# Patient Record
Sex: FEMALE | Race: WHITE | NOT HISPANIC OR LATINO | Employment: FULL TIME | URBAN - METROPOLITAN AREA
[De-identification: names, ages, dates, MRNs, and addresses within clinical notes are randomized per-mention and may not be internally consistent; named-entity substitution may affect disease eponyms.]

---

## 2017-01-30 ENCOUNTER — TELEPHONE (OUTPATIENT)
Dept: OBSTETRICS AND GYNECOLOGY | Facility: CLINIC | Age: 32
End: 2017-01-30

## 2017-01-30 NOTE — TELEPHONE ENCOUNTER
----- Message from Julia Pino sent at 1/30/2017  9:43 AM CST -----  Contact: PAtient  Patient is pregnant and wants to schedule her first appointment. Please call patient at [phone], if you call today. If not call [phone].

## 2017-02-17 ENCOUNTER — LAB VISIT (OUTPATIENT)
Dept: LAB | Facility: HOSPITAL | Age: 32
End: 2017-02-17
Attending: OBSTETRICS & GYNECOLOGY
Payer: COMMERCIAL

## 2017-02-17 ENCOUNTER — OFFICE VISIT (OUTPATIENT)
Dept: OBSTETRICS AND GYNECOLOGY | Facility: CLINIC | Age: 32
End: 2017-02-17
Payer: COMMERCIAL

## 2017-02-17 VITALS
BODY MASS INDEX: 33.57 KG/M2 | DIASTOLIC BLOOD PRESSURE: 78 MMHG | SYSTOLIC BLOOD PRESSURE: 116 MMHG | HEIGHT: 67 IN | WEIGHT: 213.88 LBS

## 2017-02-17 DIAGNOSIS — Z32.01 PREGNANCY CONFIRMED BY POSITIVE URINE TEST: ICD-10-CM

## 2017-02-17 DIAGNOSIS — O36.8390 UNABLE TO HEAR FETAL HEART TONES AS REASON FOR ULTRASOUND SCAN: ICD-10-CM

## 2017-02-17 DIAGNOSIS — N91.2 ABSENT MENSES: ICD-10-CM

## 2017-02-17 DIAGNOSIS — B00.9 HERPES INFECTION: ICD-10-CM

## 2017-02-17 DIAGNOSIS — Z01.419 ROUTINE GYNECOLOGICAL EXAMINATION: Primary | ICD-10-CM

## 2017-02-17 LAB
ABO + RH BLD: NORMAL
BLD GP AB SCN CELLS X3 SERPL QL: NORMAL
ERYTHROCYTE [DISTWIDTH] IN BLOOD BY AUTOMATED COUNT: 14.2 %
HCT VFR BLD AUTO: 40.8 %
HGB BLD-MCNC: 13.8 G/DL
MCH RBC QN AUTO: 28.6 PG
MCHC RBC AUTO-ENTMCNC: 33.8 %
MCV RBC AUTO: 85 FL
PLATELET # BLD AUTO: 245 K/UL
PMV BLD AUTO: 11.2 FL
RBC # BLD AUTO: 4.83 M/UL
TSH SERPL DL<=0.005 MIU/L-ACNC: 3.56 UIU/ML
WBC # BLD AUTO: 8.98 K/UL

## 2017-02-17 PROCEDURE — 87086 URINE CULTURE/COLONY COUNT: CPT

## 2017-02-17 PROCEDURE — 86592 SYPHILIS TEST NON-TREP QUAL: CPT

## 2017-02-17 PROCEDURE — 76817 TRANSVAGINAL US OBSTETRIC: CPT | Mod: S$GLB,,, | Performed by: OBSTETRICS & GYNECOLOGY

## 2017-02-17 PROCEDURE — 87624 HPV HI-RISK TYP POOLED RSLT: CPT

## 2017-02-17 PROCEDURE — 86900 BLOOD TYPING SEROLOGIC ABO: CPT

## 2017-02-17 PROCEDURE — 85027 COMPLETE CBC AUTOMATED: CPT

## 2017-02-17 PROCEDURE — 99385 PREV VISIT NEW AGE 18-39: CPT | Mod: 25,S$GLB,, | Performed by: OBSTETRICS & GYNECOLOGY

## 2017-02-17 PROCEDURE — 99999 PR PBB SHADOW E&M-EST. PATIENT-LVL III: CPT | Mod: PBBFAC,,, | Performed by: OBSTETRICS & GYNECOLOGY

## 2017-02-17 PROCEDURE — 36415 COLL VENOUS BLD VENIPUNCTURE: CPT | Mod: PO

## 2017-02-17 PROCEDURE — 86762 RUBELLA ANTIBODY: CPT

## 2017-02-17 PROCEDURE — 86703 HIV-1/HIV-2 1 RESULT ANTBDY: CPT

## 2017-02-17 PROCEDURE — 84443 ASSAY THYROID STIM HORMONE: CPT

## 2017-02-17 PROCEDURE — 87340 HEPATITIS B SURFACE AG IA: CPT

## 2017-02-17 PROCEDURE — 88175 CYTOPATH C/V AUTO FLUID REDO: CPT

## 2017-02-17 PROCEDURE — 86850 RBC ANTIBODY SCREEN: CPT

## 2017-02-17 PROCEDURE — 87591 N.GONORRHOEAE DNA AMP PROB: CPT

## 2017-02-17 RX ORDER — PHENTERMINE HYDROCHLORIDE 37.5 MG/1
37.5 TABLET ORAL DAILY
Refills: 0 | COMMUNITY
Start: 2016-12-09 | End: 2017-03-23

## 2017-02-17 NOTE — MR AVS SNAPSHOT
"    [facility] - OB/GYN  [address] [place] [address]  Phone: [phone]                  Shari Smith   2017 8:40 AM   Office Visit    Description:  Female : 1985   Provider:  Mei Arzate MD   Department:  [facility] - [facility]           Reason for Visit     Possible Pregnancy     Nausea                To Do List           Goals (5 Years of Data)     None      [facility]     [facility] Care Line -  Assistance  Registered nurses in the [facility] On Call Center provide clinical advisement, health education, appointment booking, and other advisory services.  Call for this free service at [phone].             Medications           Message regarding Medications     Verify the changes and/or additions to your medication regime listed below are the same as discussed with your clinician today.  If any of these changes or additions are incorrect, please notify your healthcare provider.             Verify that the below list of medications is an accurate representation of the medications you are currently taking.  If none reported, the list may be blank. If incorrect, please contact your healthcare provider. Carry this list with you in case of emergency.           Current Medications     fluticasone furoate (ARNUITY ELLIPTA) 100 mcg/actuation DsDv Inhale 100 mcg into the lungs once daily. Controller    phentermine (ADIPEX-P) 37.5 mg tablet Take 37.5 mg by mouth once daily.           Clinical Reference Information           Your Vitals Were     BP Height Weight Last Period BMI    116/78 5' 6.5" (1.689 m) 97 kg (213 lb 13.5 oz) 2016 (Exact Date) 34 kg/m2      Blood Pressure          Most Recent Value    BP  116/78      Allergies as of 2017     No Known Allergies      Immunizations Administered on Date of Encounter - 2017     None      [facility] Sign-Up     Activating your [facility] account is as easy as 1-2-3!     1) Visit [email or web address], " select Sign Up Now, enter this activation code and your date of birth, then select Next.  [email or web address]  Expires: 4/3/2017  8:29 AM      2) Create a username and password to use when you visit [facility] in the future and select a security question in case you lose your password and select Next.    3) Enter your e-mail address and click Sign Up!    Additional Information  If you have questions, please e-mail [email or web address] or call [phone] to talk to our [employer] [facility] staff. Remember, [facility] is NOT to be used for urgent needs. For medical emergencies, dial 911.         Language Assistance Services     ATTENTION: Language assistance services are available, free of charge. Please call [phone].      ATENCIÓN: Si habla marina, tiene a blair disposición servicios gratuitos de asistencia lingüística. Llame al [phone].     CHÚ Ý: N?u b?n nói Ti?ng Vi?t, có các d?ch v? h? tr? ngôn ng? mi?n phí dành cho b?n. G?i s? [phone].         [facility] complies with applicable Federal civil rights laws and does not discriminate on the basis of race, color, national origin, age, disability, or sex.

## 2017-02-17 NOTE — PROGRESS NOTES
Chief Complaint   Patient presents with    Possible Pregnancy     + UPT in office    Nausea     with acid reflux       History of Present Illness: Shari Smith is a 32 y.o. female that presents today 2017 for well gyn visit.    7.3 EDC 10/2/17    History reviewed. No pertinent past medical history.    Past Surgical History   Procedure Laterality Date    Breast surgery Bilateral     Refractive surgery      Dilation and curettage of uterus  Age 18      for heavy bleeding       Current Outpatient Prescriptions   Medication Sig Dispense Refill    fluticasone furoate (ARNUITY ELLIPTA) 100 mcg/actuation DsDv Inhale 100 mcg into the lungs once daily. Controller      phentermine (ADIPEX-P) 37.5 mg tablet Take 37.5 mg by mouth once daily.  0    PNV no.106-iron-folate #6-dha (OB COMPLETE GOLD) 27.5 mg iron- 1 mg Cap Take 1 capsule by mouth once daily. 30 capsule 11     No current facility-administered medications for this visit.        Review of patient's allergies indicates:  No Known Allergies    History reviewed. No pertinent family history.    Social History     Social History    Marital status: Single     Spouse name: N/A    Number of children: N/A    Years of education: N/A     Social History Main Topics    Smoking status: Never Smoker    Smokeless tobacco: Never Used    Alcohol use No    Drug use: No    Sexual activity: Yes     Partners: Male     Other Topics Concern    None     Social History Narrative    None       OB History    Para Term  AB SAB TAB Ectopic Multiple Living   2    1 1          # Outcome Date GA Lbr Westley/2nd Weight Sex Delivery Anes PTL Lv   2 Current            1 SAB                   Review of Symptoms:  GENERAL: Denies weight gain or weight loss. Feeling well overall.   SKIN: Denies rash or lesions.   HEAD: Denies head injury or headache.   NODES: Denies enlarged lymph nodes.   CHEST: Denies chest pain or shortness of breath.   CARDIOVASCULAR: Denies  "palpitations or left sided chest pain.   ABDOMEN: No abdominal pain, constipation, diarrhea, nausea, vomiting or rectal bleeding.   URINARY: No frequency, dysuria, hematuria, or burning on urination.  HEMATOLOGIC: No easy bruisability or excessive bleeding.   MUSCULOSKELETAL: Denies joint pain or swelling.     Visit Vitals    /78    Ht 5' 6.5" (1.689 m)    Wt 97 kg (213 lb 13.5 oz)    LMP 12/26/2016 (Exact Date)     Physical Exam:  APPEARANCE: Well nourished, well developed, in no acute distress.  SKIN: Normal skin turgor, no lesions.  NECK: Neck symmetric without masses   RESPIRATORY: Normal respiratory effort with no retractions or use of accessory muscles  CARDIOVASCULAR: Peripheral vascular system with no swelling no varicosities and palpation of pulses normal  LYMPHATIC: No enlargements of the lymph nodes noted in the neck, axillae, or groin  ABDOMEN: Soft. No tenderness or masses. No hepatosplenomegaly. No hernias.  BREASTS: Symmetrical, no skin changes or visible lesions. No palpable masses, nipple discharge or adenopathy bilaterally.  PELVIC: Normal external female genitalia without lesions. Normal hair distribution. Adequate perineal body, normal urethral meatus. Urethra with no masses.  Bladder nontender. Vagina moist and well rugated without lesions or discharge. Cervix pink and without lesions. No significant cystocele or rectocele. Bimanual exam showed uterus normal size, shape, position, mobile and nontender. Adnexa without masses or tenderness. Urethra and bladder normal. PAP DONE    ULTRASOUND:   Ultrasound performed in the usual fashion, showing viable pardo intrauterine pregnancy, crown-rump length = 1.28 cm with flicker,   consistent with LMP        and EDC 10/3/17  No free fluid in cul-de-sac or adnexal pathology.    EXTREMITIES: No clubbing cyanosis or edema.    ASSESSMENT/PLAN:  Routine gynecological examination    Absent menses  -     Liquid-based pap smear, screening  -     HPV " DNA probe, amplified  -     Urine culture  -     C. trachomatis/N. gonorrhoeae by AMP DNA Cervix    Pregnancy confirmed by positive urine test  -     Liquid-based pap smear, screening  -     HPV DNA probe, amplified  -     Urine culture  -     C. trachomatis/N. gonorrhoeae by AMP DNA Cervix  -     CBC Without Differential; Future; Expected date: 2/17/17  -     RPR; Future; Expected date: 2/17/17  -     Rubella antibody, IgG; Future; Expected date: 2/17/17  -     Hepatitis B surface antigen; Future; Expected date: 2/17/17  -     Type & Screen; Future; Expected date: 2/17/17  -     HIV-1 and HIV-2 antibodies; Future; Expected date: 2/17/17  -     TSH; Future; Expected date: 2/17/17  -     PNV no.106-iron-folate #6-dha (OB COMPLETE GOLD) 27.5 mg iron- 1 mg Cap; Take 1 capsule by mouth once daily.  Dispense: 30 capsule; Refill: 11    Herpes infection-2 outbreaks lifetime    Unable to hear fetal heart tones as reason for ultrasound scan          Patient was counseled today on Pap guidelines, recommendation for pelvic exams, mammograms every other year after the age of 40 and annually after the age of 50, Colonoscopy after the age of 50, Dexa Bone Scan and calcium and vitamin D supplementation in menopause and to see her PCP for other health maintenance.   FOLLOW-UP:prn

## 2017-02-18 ENCOUNTER — PATIENT MESSAGE (OUTPATIENT)
Dept: OBSTETRICS AND GYNECOLOGY | Facility: CLINIC | Age: 32
End: 2017-02-18

## 2017-02-18 LAB
BACTERIA UR CULT: NO GROWTH
RPR SER QL: NORMAL

## 2017-02-20 LAB
C TRACH DNA SPEC QL NAA+PROBE: NEGATIVE
HBV SURFACE AG SERPL QL IA: NEGATIVE
HIV 1+2 AB+HIV1 P24 AG SERPL QL IA: NEGATIVE
N GONORRHOEA DNA SPEC QL NAA+PROBE: NEGATIVE
RUBV IGG SER-ACNC: 31.6 IU/ML
RUBV IGG SER-IMP: REACTIVE

## 2017-02-24 ENCOUNTER — PATIENT MESSAGE (OUTPATIENT)
Dept: OBSTETRICS AND GYNECOLOGY | Facility: CLINIC | Age: 32
End: 2017-02-24

## 2017-02-24 LAB — HUMAN PAPILLOMAVIRUS (HPV): NOT DETECTED

## 2017-03-01 ENCOUNTER — PATIENT MESSAGE (OUTPATIENT)
Dept: OBSTETRICS AND GYNECOLOGY | Facility: CLINIC | Age: 32
End: 2017-03-01

## 2017-03-23 ENCOUNTER — ROUTINE PRENATAL (OUTPATIENT)
Dept: OBSTETRICS AND GYNECOLOGY | Facility: CLINIC | Age: 32
End: 2017-03-23
Payer: COMMERCIAL

## 2017-03-23 ENCOUNTER — LAB VISIT (OUTPATIENT)
Dept: LAB | Facility: HOSPITAL | Age: 32
End: 2017-03-23
Attending: OBSTETRICS & GYNECOLOGY
Payer: COMMERCIAL

## 2017-03-23 VITALS
WEIGHT: 211.88 LBS | SYSTOLIC BLOOD PRESSURE: 120 MMHG | BODY MASS INDEX: 33.68 KG/M2 | DIASTOLIC BLOOD PRESSURE: 78 MMHG

## 2017-03-23 DIAGNOSIS — Z34.01 ENCOUNTER FOR SUPERVISION OF NORMAL FIRST PREGNANCY IN FIRST TRIMESTER: Primary | ICD-10-CM

## 2017-03-23 DIAGNOSIS — Z3A.12 12 WEEKS GESTATION OF PREGNANCY: ICD-10-CM

## 2017-03-23 DIAGNOSIS — Z34.01 ENCOUNTER FOR SUPERVISION OF NORMAL FIRST PREGNANCY IN FIRST TRIMESTER: ICD-10-CM

## 2017-03-23 LAB
BILIRUB SERPL-MCNC: NEGATIVE MG/DL
BLOOD URINE, POC: NEGATIVE
COLOR, POC UA: NORMAL
GLUCOSE UR QL STRIP: NEGATIVE
KETONES UR QL STRIP: 1
LEUKOCYTE ESTERASE URINE, POC: NEGATIVE
NITRITE, POC UA: NEGATIVE
PH, POC UA: 5
PROTEIN, POC: NORMAL
SPECIFIC GRAVITY, POC UA: NORMAL
UROBILINOGEN, POC UA: NEGATIVE

## 2017-03-23 PROCEDURE — 99999 PR PBB SHADOW E&M-EST. PATIENT-LVL II: CPT | Mod: PBBFAC,,, | Performed by: OBSTETRICS & GYNECOLOGY

## 2017-03-23 PROCEDURE — 36415 COLL VENOUS BLD VENIPUNCTURE: CPT | Mod: PO

## 2017-03-23 PROCEDURE — 0502F SUBSEQUENT PRENATAL CARE: CPT | Mod: S$GLB,,, | Performed by: OBSTETRICS & GYNECOLOGY

## 2017-03-31 ENCOUNTER — PATIENT MESSAGE (OUTPATIENT)
Dept: OBSTETRICS AND GYNECOLOGY | Facility: CLINIC | Age: 32
End: 2017-03-31

## 2017-04-05 ENCOUNTER — TELEPHONE (OUTPATIENT)
Dept: OBSTETRICS AND GYNECOLOGY | Facility: CLINIC | Age: 32
End: 2017-04-05

## 2017-04-05 NOTE — TELEPHONE ENCOUNTER
Called and left a message that we are calling with [id number] results and that we have a copy at the  for her. Patient is not finding out gender.       Results Negative for Chromosomes 21,18, and 13    Female

## 2017-04-06 LAB
MISCELLANEOUS TEST NAME: NORMAL
REFERENCE LAB: NORMAL
SPECIMEN TYPE: NORMAL
TEST RESULT: NORMAL

## 2017-04-20 ENCOUNTER — ROUTINE PRENATAL (OUTPATIENT)
Dept: OBSTETRICS AND GYNECOLOGY | Facility: CLINIC | Age: 32
End: 2017-04-20
Payer: COMMERCIAL

## 2017-04-20 VITALS
DIASTOLIC BLOOD PRESSURE: 75 MMHG | WEIGHT: 214.31 LBS | BODY MASS INDEX: 34.07 KG/M2 | SYSTOLIC BLOOD PRESSURE: 120 MMHG

## 2017-04-20 DIAGNOSIS — Z34.02 ENCOUNTER FOR SUPERVISION OF NORMAL FIRST PREGNANCY IN SECOND TRIMESTER: Primary | ICD-10-CM

## 2017-04-20 DIAGNOSIS — Z3A.16 16 WEEKS GESTATION OF PREGNANCY: ICD-10-CM

## 2017-04-20 LAB
BILIRUB SERPL-MCNC: NEGATIVE MG/DL
BLOOD URINE, POC: NEGATIVE
COLOR, POC UA: NORMAL
GLUCOSE UR QL STRIP: NEGATIVE
KETONES UR QL STRIP: NEGATIVE
LEUKOCYTE ESTERASE URINE, POC: 2
NITRITE, POC UA: NEGATIVE
PH, POC UA: 7
PROTEIN, POC: NEGATIVE
SPECIFIC GRAVITY, POC UA: NORMAL
UROBILINOGEN, POC UA: NEGATIVE

## 2017-04-20 PROCEDURE — 0502F SUBSEQUENT PRENATAL CARE: CPT | Mod: S$GLB,,, | Performed by: OBSTETRICS & GYNECOLOGY

## 2017-04-20 PROCEDURE — 81002 URINALYSIS NONAUTO W/O SCOPE: CPT | Mod: S$GLB,,, | Performed by: OBSTETRICS & GYNECOLOGY

## 2017-04-20 PROCEDURE — 99999 PR PBB SHADOW E&M-EST. PATIENT-LVL II: CPT | Mod: PBBFAC,,, | Performed by: OBSTETRICS & GYNECOLOGY

## 2017-04-20 NOTE — MR AVS SNAPSHOT
[facility] - [facility]  [address] [place] [address]  Phone: [phone]                  Shari Smith   2017 8:40 AM   Routine Prenatal    Description:  Female : 1985   Provider:  Mei Arzate MD   Department:  [facility] - [facility]           Reason for Visit     Routine Prenatal Visit     Cough     Vomiting                To Do List           Goals (5 Years of Data)     None      [facility]     [facility] Call [facility] Care Line -  Assistance  Unless otherwise directed by your provider, please contact [facility] On-Call, our nurse care line that is available for  assistance.     Registered nurses in the [facility] On Call Center provide: appointment scheduling, clinical advisement, health education, and other advisory services.  Call: [phone] (toll free)               Medications           Message regarding Medications     Verify the changes and/or additions to your medication regime listed below are the same as discussed with your clinician today.  If any of these changes or additions are incorrect, please notify your healthcare provider.             Verify that the below list of medications is an accurate representation of the medications you are currently taking.  If none reported, the list may be blank. If incorrect, please contact your healthcare provider. Carry this list with you in case of emergency.           Current Medications     PNV no.106-iron-folate #6-dha (OB COMPLETE GOLD) 27.5 mg iron- 1 mg Cap Take 1 capsule by mouth once daily.    fluticasone furoate (ARNUITY ELLIPTA) 100 mcg/actuation DsDv Inhale 100 mcg into the lungs once daily. Controller           Clinical Reference Information           Prenatal Vitals     Enc. Date GA Prenatal Vitals Prenatal Pulse Pain Level Urine Albumin/Glucose Edema Presentation Dilation/Effacement/Station    17 16w3d 120/75 / 97.2 kg (214 lb 4.6 oz)  / 148 / Absent  0 Negative / Negative       3/23/17 12w3d  120/78 / 96.1 kg (211 lb 13.8 oz)  / 147 / Absent   Trace / Negative         Your Vitals Were     BP Weight Last Period BMI       120/75 97.2 kg (214 lb 4.6 oz) 12/26/2016 (LMP Unknown) 34.07 kg/m2       Allergies as of 4/20/2017     No Known Allergies      Immunizations Administered on Date of Encounter - 4/20/2017     None      Language Assistance Services     ATTENTION: Language assistance services are available, free of charge. Please call [phone].      ATENCIÓN: Si habla briannaañol, tiene a blair disposición servicios gratuitos de asistencia lingüística. Llame al [phone].     CHÚ Ý: N?u b?n nói Ti?ng Vi?t, có các d?ch v? h? tr? ngôn ng? mi?n phí dành cho b?n. G?i s? [phone].         [facility] - [facility] complies with applicable Federal civil rights laws and does not discriminate on the basis of race, color, national origin, age, disability, or sex.

## 2017-04-20 NOTE — PROGRESS NOTES
Shari Smith is a 32 y.o. female with Estimated Date of Delivery: 10/2/17   Obstetric History       T0      TAB0   SAB1   E0   M0   L0         AT 16w3d  Here today on 2017 for   Chief Complaint   Patient presents with    Routine Prenatal Visit    Cough    Vomiting       Current Outpatient Prescriptions   Medication Sig Dispense Refill    PNV no.106-iron-folate #6-dha (OB COMPLETE GOLD) 27.5 mg iron- 1 mg Cap Take 1 capsule by mouth once daily. 30 capsule 11    fluticasone furoate (ARNUITY ELLIPTA) 100 mcg/actuation DsDv Inhale 100 mcg into the lungs once daily. Controller       No current facility-administered medications for this visit.      Review of patient's allergies indicates:  No Known Allergies    No past medical history on file.    Past Surgical History:   Procedure Laterality Date    BREAST SURGERY Bilateral     DILATION AND CURETTAGE OF UTERUS  Age 18     for heavy bleeding    REFRACTIVE SURGERY         OB History    Para Term  AB SAB TAB Ectopic Multiple Living   2    1 1          # Outcome Date GA Lbr Westley/2nd Weight Sex Delivery Anes PTL Lv   2 Current            1 SAB                   Social History     Social History    Marital status: Single     Spouse name: N/A    Number of children: N/A    Years of education: N/A     Social History Main Topics    Smoking status: Never Smoker    Smokeless tobacco: Never Used    Alcohol use No    Drug use: No    Sexual activity: Yes     Partners: Male     Other Topics Concern    Not on file     Social History Narrative       Vitals:    17 0856   BP: 120/75       Review of Symptoms:  GENERAL: Denies fatigue, and malaise.   SKIN: Denies rash or lesions.   HEAD: Denies head injury or headache.   NODES: Denies enlarged lymph nodes.   CHEST: Denies chest pain or shortness of breath.   CARDIOVASCULAR: Denies palpitations or left sided chest pain.   ABDOMEN: No abdominal pain, constipation, diarrhea, nausea,  vomiting or rectal bleeding.   URINARY: No frequency, urgency, dysuria, or hematuria  MUSCULOSKELETAL: Denies joint pain or swelling.   NEUROLOGIC: Denies seizures.    O POS    ASSESSMENT    Encounter Diagnoses   Name Primary?    Encounter for supervision of normal first pregnancy in second trimester Yes    16 weeks gestation of pregnancy        PLAN  1.  RTC 4 weeks     I have reviewed the blood pressure, urine results, fetal heart rate check, fundal height and agree.

## 2017-05-05 ENCOUNTER — PATIENT MESSAGE (OUTPATIENT)
Dept: OBSTETRICS AND GYNECOLOGY | Facility: CLINIC | Age: 32
End: 2017-05-05

## 2017-05-12 ENCOUNTER — ROUTINE PRENATAL (OUTPATIENT)
Dept: OBSTETRICS AND GYNECOLOGY | Facility: CLINIC | Age: 32
End: 2017-05-12
Payer: COMMERCIAL

## 2017-05-12 ENCOUNTER — HOSPITAL ENCOUNTER (OUTPATIENT)
Dept: RADIOLOGY | Facility: CLINIC | Age: 32
Discharge: HOME | End: 2017-05-12
Attending: OBSTETRICS & GYNECOLOGY
Payer: COMMERCIAL

## 2017-05-12 VITALS
BODY MASS INDEX: 34.49 KG/M2 | WEIGHT: 216.94 LBS | SYSTOLIC BLOOD PRESSURE: 138 MMHG | DIASTOLIC BLOOD PRESSURE: 58 MMHG

## 2017-05-12 DIAGNOSIS — Z3A.16 16 WEEKS GESTATION OF PREGNANCY: ICD-10-CM

## 2017-05-12 DIAGNOSIS — Z3A.19 19 WEEKS GESTATION OF PREGNANCY: ICD-10-CM

## 2017-05-12 DIAGNOSIS — Z34.02 ENCOUNTER FOR SUPERVISION OF NORMAL FIRST PREGNANCY IN SECOND TRIMESTER: Primary | ICD-10-CM

## 2017-05-12 PROCEDURE — 76805 OB US >/= 14 WKS SNGL FETUS: CPT | Mod: 26,,, | Performed by: RADIOLOGY

## 2017-05-12 PROCEDURE — 76805 OB US >/= 14 WKS SNGL FETUS: CPT | Mod: TC,PO

## 2017-05-12 PROCEDURE — 99999 PR PBB SHADOW E&M-EST. PATIENT-LVL II: CPT | Mod: PBBFAC,,, | Performed by: OBSTETRICS & GYNECOLOGY

## 2017-05-12 PROCEDURE — 0502F SUBSEQUENT PRENATAL CARE: CPT | Mod: S$GLB,,, | Performed by: OBSTETRICS & GYNECOLOGY

## 2017-05-12 NOTE — PROGRESS NOTES
Shari Smith is a 32 y.o. female with Estimated Date of Delivery: 10/2/17   Obstetric History       T0      TAB0   SAB1   E0   M0   L0         AT 19w4d  Here today on 2017 for   Chief Complaint   Patient presents with    Routine Prenatal Visit       Current Outpatient Prescriptions   Medication Sig Dispense Refill    PNV no.106-iron-folate #6-dha (OB COMPLETE GOLD) 27.5 mg iron- 1 mg Cap Take 1 capsule by mouth once daily. 30 capsule 11    fluticasone furoate (ARNUITY ELLIPTA) 100 mcg/actuation DsDv Inhale 100 mcg into the lungs once daily. Controller       No current facility-administered medications for this visit.      Review of patient's allergies indicates:  No Known Allergies    No past medical history on file.    Past Surgical History:   Procedure Laterality Date    BREAST SURGERY Bilateral     DILATION AND CURETTAGE OF UTERUS  Age 18     for heavy bleeding    REFRACTIVE SURGERY         OB History    Para Term  AB SAB TAB Ectopic Multiple Living   2    1 1          # Outcome Date GA Lbr Westley/2nd Weight Sex Delivery Anes PTL Lv   2 Current            1 SAB                   Social History     Social History    Marital status: Single     Spouse name: N/A    Number of children: N/A    Years of education: N/A     Social History Main Topics    Smoking status: Never Smoker    Smokeless tobacco: Never Used    Alcohol use No    Drug use: No    Sexual activity: Yes     Partners: Male     Other Topics Concern    Not on file     Social History Narrative       Vitals:    17 0959   BP: (!) 138/58       Review of Symptoms:  GENERAL: Denies fatigue, and malaise.   SKIN: Denies rash or lesions.   HEAD: Denies head injury or headache.   NODES: Denies enlarged lymph nodes.   CHEST: Denies chest pain or shortness of breath.   CARDIOVASCULAR: Denies palpitations or left sided chest pain.   ABDOMEN: No abdominal pain, constipation, diarrhea, nausea, vomiting or rectal  bleeding.   URINARY: No frequency, urgency, dysuria, or hematuria  MUSCULOSKELETAL: Denies joint pain or swelling.   NEUROLOGIC: Denies seizures.    O POS    ASSESSMENT    Encounter Diagnoses   Name Primary?    19 weeks gestation of pregnancy Yes    Encounter for supervision of normal first pregnancy in second trimester        PLAN  1.  RTC 5 weeks     I have reviewed the blood pressure, urine results, fetal heart rate check, fundal height and agree.

## 2017-05-12 NOTE — MR AVS SNAPSHOT
[facility]  [address], [address]  [facility] [facility] [address]  Phone: [phone]  Fax: [phone]                  Shari Smith   2017 9:40 AM   Routine Prenatal    Description:  Female : 1985   Provider:  Mei Arzate MD   Department:  [facility]           Reason for Visit     Routine Prenatal Visit                To Do List           Goals (5 Years of Data)     None      [facility]     [facility] Care [facility] -  Assistance  Unless otherwise directed by your provider, please contact [facility], our nurse care line that is available for  assistance.     Registered nurses in the [facility] On Call Center provide: appointment scheduling, clinical advisement, health education, and other advisory services.  Call: [phone] (toll free)               Medications           Message regarding Medications     Verify the changes and/or additions to your medication regime listed below are the same as discussed with your clinician today.  If any of these changes or additions are incorrect, please notify your healthcare provider.             Verify that the below list of medications is an accurate representation of the medications you are currently taking.  If none reported, the list may be blank. If incorrect, please contact your healthcare provider. Carry this list with you in case of emergency.           Current Medications     fluticasone furoate (ARNUITY ELLIPTA) 100 mcg/actuation DsDv Inhale 100 mcg into the lungs once daily. Controller    PNV no.106-iron-folate #6-dha (OB COMPLETE GOLD) 27.5 mg iron- 1 mg Cap Take 1 capsule by mouth once daily.           Clinical Reference Information           Prenatal Vitals     Enc. Date GA Prenatal Vitals Prenatal Pulse Pain Level Urine Albumin/Glucose Edema Presentation Dilation/Effacement/Station    17 19w4d 138/58 (A) / 98.4 kg (216 lb 14.9 oz)  / 152 / Absent  0         4/20/17 16w3d 120/75 / 97.2 kg (214 lb 4.6 oz)  / 148 / Absent  0 Negative / Negative       3/23/17 12w3d 120/78 / 96.1 kg (211 lb 13.8 oz)  / 147 / Absent   Trace / Negative         Your Vitals Were     BP Weight Last Period BMI       138/58 98.4 kg (216 lb 14.9 oz) 12/26/2016 (LMP Unknown) 34.49 kg/m2       Allergies as of 5/12/2017     No Known Allergies      Immunizations Administered on Date of Encounter - 5/12/2017     None      Language Assistance Services     ATTENTION: Language assistance services are available, free of charge. Please call [phone].      ATENCIÓN: Si habla marina, tiene a blair disposición servicios gratuitos de asistencia lingüística. Llame al [phone].     CHÚ Ý: N?u b?n nói Ti?ng Vi?t, có các d?ch v? h? tr? ngôn ng? mi?n phí dành cho b?n. G?i s? [phone].         [facility] complies with applicable Federal civil rights laws and does not discriminate on the basis of race, color, national origin, age, disability, or sex.

## 2017-06-13 ENCOUNTER — ROUTINE PRENATAL (OUTPATIENT)
Dept: OBSTETRICS AND GYNECOLOGY | Facility: CLINIC | Age: 32
End: 2017-06-13
Payer: COMMERCIAL

## 2017-06-13 ENCOUNTER — LAB VISIT (OUTPATIENT)
Dept: LAB | Facility: HOSPITAL | Age: 32
End: 2017-06-13
Attending: OBSTETRICS & GYNECOLOGY
Payer: COMMERCIAL

## 2017-06-13 VITALS — DIASTOLIC BLOOD PRESSURE: 72 MMHG | BODY MASS INDEX: 35.3 KG/M2 | SYSTOLIC BLOOD PRESSURE: 118 MMHG | WEIGHT: 222 LBS

## 2017-06-13 DIAGNOSIS — Z34.02 ENCOUNTER FOR SUPERVISION OF NORMAL FIRST PREGNANCY IN SECOND TRIMESTER: ICD-10-CM

## 2017-06-13 DIAGNOSIS — Z3A.24 24 WEEKS GESTATION OF PREGNANCY: Primary | ICD-10-CM

## 2017-06-13 DIAGNOSIS — R73.09 ABNORMAL GTT (GLUCOSE TOLERANCE TEST): Primary | ICD-10-CM

## 2017-06-13 LAB — GLUCOSE SERPL-MCNC: 143 MG/DL

## 2017-06-13 PROCEDURE — 82950 GLUCOSE TEST: CPT

## 2017-06-13 PROCEDURE — 0502F SUBSEQUENT PRENATAL CARE: CPT | Mod: S$GLB,,, | Performed by: OBSTETRICS & GYNECOLOGY

## 2017-06-13 PROCEDURE — 36415 COLL VENOUS BLD VENIPUNCTURE: CPT | Mod: PO

## 2017-06-13 PROCEDURE — 99999 PR PBB SHADOW E&M-EST. PATIENT-LVL II: CPT | Mod: PBBFAC,,, | Performed by: OBSTETRICS & GYNECOLOGY

## 2017-06-13 NOTE — PROGRESS NOTES
Shari Smith is a 32 y.o. female with Estimated Date of Delivery: 10/2/17   Obstetric History       T0      L0     SAB0   TAB0   Ectopic0   Multiple0   Live Births0         AT 24w1d  Here today on 2017 for   Chief Complaint   Patient presents with    Routine Prenatal Visit    Cough     yellow productive cough x1 week       Current Outpatient Prescriptions   Medication Sig Dispense Refill    PNV no.106-iron-folate #6-dha (OB COMPLETE GOLD) 27.5 mg iron- 1 mg Cap Take 1 capsule by mouth once daily. 30 capsule 11    fluticasone furoate (ARNUITY ELLIPTA) 100 mcg/actuation DsDv Inhale 100 mcg into the lungs once daily. Controller       No current facility-administered medications for this visit.      Review of patient's allergies indicates:  No Known Allergies    No past medical history on file.    Past Surgical History:   Procedure Laterality Date    BREAST SURGERY Bilateral     DILATION AND CURETTAGE OF UTERUS  Age 18     for heavy bleeding    REFRACTIVE SURGERY         OB History    Para Term  AB Living   2    1    SAB TAB Ectopic Multiple Live Births   1          # Outcome Date GA Lbr Westley/2nd Weight Sex Delivery Anes PTL Lv   2 Current            1 SAB                   Social History     Social History    Marital status: Single     Spouse name: N/A    Number of children: N/A    Years of education: N/A     Social History Main Topics    Smoking status: Never Smoker    Smokeless tobacco: Never Used    Alcohol use No    Drug use: No    Sexual activity: Yes     Partners: Male     Other Topics Concern    Not on file     Social History Narrative    No narrative on file       Vitals:    17 0925   BP: 118/72       Review of Symptoms:  GENERAL: Denies fatigue, and malaise.   SKIN: Denies rash or lesions.   HEAD: Denies head injury or headache.   NODES: Denies enlarged lymph nodes.   CHEST: Denies chest pain or shortness of breath.   CARDIOVASCULAR: Denies  palpitations or left sided chest pain.   ABDOMEN: No abdominal pain, constipation, diarrhea, nausea, vomiting or rectal bleeding.   URINARY: No frequency, urgency, dysuria, or hematuria  MUSCULOSKELETAL: Denies joint pain or swelling.   NEUROLOGIC: Denies seizures.    O POS    ASSESSMENT    Encounter Diagnoses   Name Primary?    24 weeks gestation of pregnancy Yes    Encounter for supervision of normal first pregnancy in second trimester        PLAN  1.  RTC 3 weeks     I have reviewed the blood pressure, urine results, fetal heart rate check, fundal height and agree.

## 2017-06-15 ENCOUNTER — LAB VISIT (OUTPATIENT)
Dept: LAB | Facility: HOSPITAL | Age: 32
End: 2017-06-15
Attending: OBSTETRICS & GYNECOLOGY
Payer: COMMERCIAL

## 2017-06-15 DIAGNOSIS — R73.09 ABNORMAL GTT (GLUCOSE TOLERANCE TEST): ICD-10-CM

## 2017-06-15 LAB
GLUCOSE SERPL-MCNC: 148 MG/DL
GLUCOSE SERPL-MCNC: 165 MG/DL
GLUCOSE SERPL-MCNC: 64 MG/DL
GLUCOSE SERPL-MCNC: 81 MG/DL

## 2017-06-15 PROCEDURE — 36415 COLL VENOUS BLD VENIPUNCTURE: CPT | Mod: PO

## 2017-06-15 PROCEDURE — 82951 GLUCOSE TOLERANCE TEST (GTT): CPT

## 2017-06-16 ENCOUNTER — TELEPHONE (OUTPATIENT)
Dept: OBSTETRICS AND GYNECOLOGY | Facility: CLINIC | Age: 32
End: 2017-06-16

## 2017-06-16 NOTE — TELEPHONE ENCOUNTER
----- Message from Sandy Smiley sent at 6/16/2017  3:32 PM CDT -----  Contact: Patient  Shari, patient [phone] at work until 5 Pm, Returning the nurse's sergio. Please advise. Thank.

## 2017-06-26 ENCOUNTER — PATIENT MESSAGE (OUTPATIENT)
Dept: OBSTETRICS AND GYNECOLOGY | Facility: CLINIC | Age: 32
End: 2017-06-26

## 2017-07-03 ENCOUNTER — ROUTINE PRENATAL (OUTPATIENT)
Dept: OBSTETRICS AND GYNECOLOGY | Facility: CLINIC | Age: 32
End: 2017-07-03
Payer: COMMERCIAL

## 2017-07-03 VITALS
WEIGHT: 220.88 LBS | DIASTOLIC BLOOD PRESSURE: 82 MMHG | SYSTOLIC BLOOD PRESSURE: 122 MMHG | BODY MASS INDEX: 35.12 KG/M2

## 2017-07-03 DIAGNOSIS — Z34.93 NORMAL PREGNANCY, THIRD TRIMESTER: Primary | ICD-10-CM

## 2017-07-03 DIAGNOSIS — B00.9 HERPES INFECTION: ICD-10-CM

## 2017-07-03 DIAGNOSIS — Z3A.27 27 WEEKS GESTATION OF PREGNANCY: ICD-10-CM

## 2017-07-03 PROCEDURE — 99999 PR PBB SHADOW E&M-EST. PATIENT-LVL II: CPT | Mod: PBBFAC,,, | Performed by: OBSTETRICS & GYNECOLOGY

## 2017-07-03 PROCEDURE — 0502F SUBSEQUENT PRENATAL CARE: CPT | Mod: S$GLB,,, | Performed by: OBSTETRICS & GYNECOLOGY

## 2017-07-03 NOTE — PROGRESS NOTES
Shari Smith is a 32 y.o. female with Estimated Date of Delivery: 10/2/17   Obstetric History       T0      L0     SAB0   TAB0   Ectopic0   Multiple0   Live Births0         AT 27w0d  Here today on 7/3/2017 for   Chief Complaint   Patient presents with    Routine Prenatal Visit    Cough    Abdominal Pain     upper abd       Current Outpatient Prescriptions   Medication Sig Dispense Refill    PNV no.106-iron-folate #6-dha (OB COMPLETE GOLD) 27.5 mg iron- 1 mg Cap Take 1 capsule by mouth once daily. 30 capsule 11    fluticasone furoate (ARNUITY ELLIPTA) 100 mcg/actuation DsDv Inhale 100 mcg into the lungs once daily. Controller       No current facility-administered medications for this visit.      Review of patient's allergies indicates:  No Known Allergies    No past medical history on file.    Past Surgical History:   Procedure Laterality Date    BREAST SURGERY Bilateral     DILATION AND CURETTAGE OF UTERUS  Age 18     for heavy bleeding    REFRACTIVE SURGERY         OB History    Para Term  AB Living   2       1     SAB TAB Ectopic Multiple Live Births   1              # Outcome Date GA Lbr Westley/2nd Weight Sex Delivery Anes PTL Lv   2 Current            1 SAB                   Social History     Social History    Marital status: Single     Spouse name: N/A    Number of children: N/A    Years of education: N/A     Social History Main Topics    Smoking status: Never Smoker    Smokeless tobacco: Never Used    Alcohol use No    Drug use: No    Sexual activity: Yes     Partners: Male     Other Topics Concern    Not on file     Social History Narrative    No narrative on file       Vitals:    17 0849   BP: 122/82       Review of Symptoms:  GENERAL: Denies fatigue, and malaise.   SKIN: Denies rash or lesions.   HEAD: Denies head injury or headache.   NODES: Denies enlarged lymph nodes.   CHEST: Denies chest pain or shortness of breath.   CARDIOVASCULAR: Denies  palpitations or left sided chest pain.   ABDOMEN: No abdominal pain, constipation, diarrhea, nausea, vomiting or rectal bleeding.   URINARY: No frequency, urgency, dysuria, or hematuria  MUSCULOSKELETAL: Denies joint pain or swelling.   NEUROLOGIC: Denies seizures.    O POS    ASSESSMENT    Encounter Diagnoses   Name Primary?    Normal pregnancy, third trimester Yes    27 weeks gestation of pregnancy     Herpes infection-2 outbreaks lifetime        PLAN  1.  RTC 3 weeks     I have reviewed the blood pressure, urine results, fetal heart rate check, fundal height and agree.

## 2017-07-18 ENCOUNTER — PATIENT MESSAGE (OUTPATIENT)
Dept: OBSTETRICS AND GYNECOLOGY | Facility: CLINIC | Age: 32
End: 2017-07-18

## 2017-08-01 ENCOUNTER — ROUTINE PRENATAL (OUTPATIENT)
Dept: OBSTETRICS AND GYNECOLOGY | Facility: CLINIC | Age: 32
End: 2017-08-01
Payer: COMMERCIAL

## 2017-08-01 VITALS
WEIGHT: 223.75 LBS | BODY MASS INDEX: 35.58 KG/M2 | DIASTOLIC BLOOD PRESSURE: 78 MMHG | SYSTOLIC BLOOD PRESSURE: 126 MMHG

## 2017-08-01 DIAGNOSIS — B00.9 HERPES INFECTION: ICD-10-CM

## 2017-08-01 DIAGNOSIS — Z34.93 NORMAL PREGNANCY, THIRD TRIMESTER: ICD-10-CM

## 2017-08-01 DIAGNOSIS — Z3A.31 31 WEEKS GESTATION OF PREGNANCY: Primary | ICD-10-CM

## 2017-08-01 PROCEDURE — 99999 PR PBB SHADOW E&M-EST. PATIENT-LVL II: CPT | Mod: PBBFAC,,, | Performed by: OBSTETRICS & GYNECOLOGY

## 2017-08-01 PROCEDURE — 0502F SUBSEQUENT PRENATAL CARE: CPT | Mod: S$GLB,,, | Performed by: OBSTETRICS & GYNECOLOGY

## 2017-08-01 NOTE — PROGRESS NOTES
Shari Smith is a 32 y.o. female with Estimated Date of Delivery: 10/2/17   Obstetric History       T0      L0     SAB0   TAB0   Ectopic0   Multiple0   Live Births0         AT 31w1d  Here today on 2017 for   Chief Complaint   Patient presents with    Routine Prenatal Visit    Cough     left rib pain       Current Outpatient Prescriptions   Medication Sig Dispense Refill    fluticasone furoate (ARNUITY ELLIPTA) 100 mcg/actuation DsDv Inhale 100 mcg into the lungs once daily. Controller      PNV no.106-iron-folate #6-dha (OB COMPLETE GOLD) 27.5 mg iron- 1 mg Cap Take 1 capsule by mouth once daily. 30 capsule 11     No current facility-administered medications for this visit.      Review of patient's allergies indicates:  No Known Allergies    No past medical history on file.    Past Surgical History:   Procedure Laterality Date    BREAST SURGERY Bilateral     DILATION AND CURETTAGE OF UTERUS  Age 18     for heavy bleeding    REFRACTIVE SURGERY         OB History    Para Term  AB Living   2       1     SAB TAB Ectopic Multiple Live Births   1              # Outcome Date GA Lbr Westley/2nd Weight Sex Delivery Anes PTL Lv   2 Current            1 SAB                   Social History     Social History    Marital status: Single     Spouse name: N/A    Number of children: N/A    Years of education: N/A     Social History Main Topics    Smoking status: Never Smoker    Smokeless tobacco: Never Used    Alcohol use No    Drug use: No    Sexual activity: Yes     Partners: Male     Other Topics Concern    Not on file     Social History Narrative    No narrative on file       Vitals:    17 0945   BP: 126/78       Review of Symptoms:  GENERAL: Denies fatigue, and malaise.   SKIN: Denies rash or lesions.   HEAD: Denies head injury or headache.   NODES: Denies enlarged lymph nodes.   CHEST: Denies chest pain or shortness of breath.   CARDIOVASCULAR: Denies palpitations or left  sided chest pain.   ABDOMEN: No abdominal pain, constipation, diarrhea, nausea, vomiting or rectal bleeding.   URINARY: No frequency, urgency, dysuria, or hematuria  MUSCULOSKELETAL: Denies joint pain or swelling.   NEUROLOGIC: Denies seizures.    O POS    ASSESSMENT    Encounter Diagnoses   Name Primary?    Normal pregnancy, third trimester Yes    31 weeks gestation of pregnancy     Herpes infection-2 outbreaks lifetime        PLAN  1.  RTC 2 weeks    I have reviewed the blood pressure, urine results, fetal heart rate check, fundal height and agree.

## 2017-08-04 PROBLEM — R52 PAIN: Status: ACTIVE | Noted: 2017-08-04

## 2017-08-04 PROBLEM — R10.9 ABDOMINAL PAIN DURING PREGNANCY: Status: ACTIVE | Noted: 2017-08-04

## 2017-08-04 PROBLEM — O26.899 ABDOMINAL PAIN DURING PREGNANCY: Status: ACTIVE | Noted: 2017-08-04

## 2017-08-05 ENCOUNTER — DOCUMENTATION ONLY (OUTPATIENT)
Dept: OBSTETRICS AND GYNECOLOGY | Facility: CLINIC | Age: 32
End: 2017-08-05

## 2017-08-05 NOTE — PROGRESS NOTES
NST    140 moderate variability with accels    TOCO     No contractions    ASSESSMENT  Reassuring fetal testing.

## 2017-08-18 ENCOUNTER — PATIENT MESSAGE (OUTPATIENT)
Dept: OBSTETRICS AND GYNECOLOGY | Facility: CLINIC | Age: 32
End: 2017-08-18

## 2017-08-21 ENCOUNTER — ROUTINE PRENATAL (OUTPATIENT)
Dept: OBSTETRICS AND GYNECOLOGY | Facility: CLINIC | Age: 32
End: 2017-08-21
Payer: COMMERCIAL

## 2017-08-21 ENCOUNTER — HOSPITAL ENCOUNTER (OUTPATIENT)
Dept: RADIOLOGY | Facility: HOSPITAL | Age: 32
Discharge: HOME | End: 2017-08-21
Attending: OBSTETRICS & GYNECOLOGY
Payer: COMMERCIAL

## 2017-08-21 VITALS
SYSTOLIC BLOOD PRESSURE: 112 MMHG | BODY MASS INDEX: 36.94 KG/M2 | WEIGHT: 228.81 LBS | DIASTOLIC BLOOD PRESSURE: 78 MMHG

## 2017-08-21 DIAGNOSIS — Z3A.31 31 WEEKS GESTATION OF PREGNANCY: ICD-10-CM

## 2017-08-21 DIAGNOSIS — B00.9 HERPES INFECTION: ICD-10-CM

## 2017-08-21 DIAGNOSIS — Z3A.34 34 WEEKS GESTATION OF PREGNANCY: Primary | ICD-10-CM

## 2017-08-21 DIAGNOSIS — Z34.93 NORMAL PREGNANCY, THIRD TRIMESTER: ICD-10-CM

## 2017-08-21 PROCEDURE — 99999 PR PBB SHADOW E&M-EST. PATIENT-LVL II: CPT | Mod: PBBFAC,,, | Performed by: OBSTETRICS & GYNECOLOGY

## 2017-08-21 PROCEDURE — 76816 OB US FOLLOW-UP PER FETUS: CPT | Mod: 26,,, | Performed by: RADIOLOGY

## 2017-08-21 PROCEDURE — 76816 OB US FOLLOW-UP PER FETUS: CPT | Mod: TC

## 2017-08-21 PROCEDURE — 0502F SUBSEQUENT PRENATAL CARE: CPT | Mod: S$GLB,,, | Performed by: OBSTETRICS & GYNECOLOGY

## 2017-08-21 NOTE — PROGRESS NOTES
Shari Smith is a 32 y.o. female with Estimated Date of Delivery: 10/2/17   Obstetric History       T0      L0     SAB0   TAB0   Ectopic0   Multiple0   Live Births0         AT 34w0d  Here today on 2017 for   Chief Complaint   Patient presents with    Routine Prenatal Visit    Swelling     on the left side leg and foot with pain, compression stocking made it worse    Chest Pain       Current Outpatient Prescriptions   Medication Sig Dispense Refill    fluticasone furoate (ARNUITY ELLIPTA) 100 mcg/actuation DsDv Inhale 100 mcg into the lungs once daily. Controller      PNV no.106-iron-folate #6-dha (OB COMPLETE GOLD) 27.5 mg iron- 1 mg Cap Take 1 capsule by mouth once daily. 30 capsule 11     No current facility-administered medications for this visit.      Review of patient's allergies indicates:  No Known Allergies    No past medical history on file.    Past Surgical History:   Procedure Laterality Date    BREAST SURGERY Bilateral     DILATION AND CURETTAGE OF UTERUS  Age 18     for heavy bleeding    REFRACTIVE SURGERY         OB History    Para Term  AB Living   2       1     SAB TAB Ectopic Multiple Live Births   1              # Outcome Date GA Lbr Westley/2nd Weight Sex Delivery Anes PTL Lv   2 Current            1 SAB                   Social History     Social History    Marital status: Single     Spouse name: N/A    Number of children: N/A    Years of education: N/A     Social History Main Topics    Smoking status: Never Smoker    Smokeless tobacco: Never Used    Alcohol use No    Drug use: No    Sexual activity: Yes     Partners: Male     Other Topics Concern    Not on file     Social History Narrative    No narrative on file       Vitals:    17 1007   BP: 112/78       Review of Symptoms:  GENERAL: Denies fatigue, and malaise.   SKIN: Denies rash or lesions.   HEAD: Denies head injury or headache.   NODES: Denies enlarged lymph nodes.   CHEST: Denies  chest pain or shortness of breath.   CARDIOVASCULAR: Denies palpitations or left sided chest pain.   ABDOMEN: No abdominal pain, constipation, diarrhea, nausea, vomiting or rectal bleeding.   URINARY: No frequency, urgency, dysuria, or hematuria  MUSCULOSKELETAL: Denies joint pain or swelling.   NEUROLOGIC: Denies seizures.    O POS    ASSESSMENT    Encounter Diagnoses   Name Primary?    34 weeks gestation of pregnancy Yes       PLAN  1.  RTC 2 weeks     I have reviewed the blood pressure, urine results, fetal heart rate check, fundal height and agree.

## 2017-09-08 ENCOUNTER — ROUTINE PRENATAL (OUTPATIENT)
Dept: OBSTETRICS AND GYNECOLOGY | Facility: CLINIC | Age: 32
End: 2017-09-08
Payer: COMMERCIAL

## 2017-09-08 VITALS
BODY MASS INDEX: 37.65 KG/M2 | WEIGHT: 233.25 LBS | DIASTOLIC BLOOD PRESSURE: 74 MMHG | SYSTOLIC BLOOD PRESSURE: 114 MMHG

## 2017-09-08 DIAGNOSIS — Z3A.36 36 WEEKS GESTATION OF PREGNANCY: ICD-10-CM

## 2017-09-08 DIAGNOSIS — Z34.93 NORMAL PREGNANCY, THIRD TRIMESTER: Primary | ICD-10-CM

## 2017-09-08 LAB
BILIRUB SERPL-MCNC: NORMAL MG/DL
BLOOD URINE, POC: NORMAL
COLOR, POC UA: YELLOW
GLUCOSE UR QL STRIP: NORMAL
KETONES UR QL STRIP: NORMAL
LEUKOCYTE ESTERASE URINE, POC: NORMAL
NITRITE, POC UA: NORMAL
PH, POC UA: 6
PROTEIN, POC: NORMAL
SPECIFIC GRAVITY, POC UA: NORMAL
UROBILINOGEN, POC UA: NORMAL

## 2017-09-08 PROCEDURE — 0502F SUBSEQUENT PRENATAL CARE: CPT | Mod: S$GLB,,, | Performed by: OBSTETRICS & GYNECOLOGY

## 2017-09-08 PROCEDURE — 87081 CULTURE SCREEN ONLY: CPT

## 2017-09-08 PROCEDURE — 99999 PR PBB SHADOW E&M-EST. PATIENT-LVL III: CPT | Mod: PBBFAC,,, | Performed by: OBSTETRICS & GYNECOLOGY

## 2017-09-08 NOTE — PROGRESS NOTES
Shari Smith is a 32 y.o. female with Estimated Date of Delivery: 10/2/17   Obstetric History       T0      L0     SAB0   TAB0   Ectopic0   Multiple0   Live Births0         AT 36w4d  Here today on 2017 for   Chief Complaint   Patient presents with    Routine Prenatal Visit       Current Outpatient Prescriptions   Medication Sig Dispense Refill    PNV no.106-iron-folate #6-dha (OB COMPLETE GOLD) 27.5 mg iron- 1 mg Cap Take 1 capsule by mouth once daily. 30 capsule 11    fluticasone furoate (ARNUITY ELLIPTA) 100 mcg/actuation DsDv Inhale 100 mcg into the lungs once daily. Controller       No current facility-administered medications for this visit.      Review of patient's allergies indicates:  No Known Allergies    History reviewed. No pertinent past medical history.    Past Surgical History:   Procedure Laterality Date    BREAST SURGERY Bilateral     DILATION AND CURETTAGE OF UTERUS  Age 18     for heavy bleeding    REFRACTIVE SURGERY         OB History    Para Term  AB Living   2       1     SAB TAB Ectopic Multiple Live Births   1              # Outcome Date GA Lbr Westley/2nd Weight Sex Delivery Anes PTL Lv   2 Current            1 SAB                   Social History     Social History    Marital status: Single     Spouse name: N/A    Number of children: N/A    Years of education: N/A     Social History Main Topics    Smoking status: Never Smoker    Smokeless tobacco: Never Used    Alcohol use No    Drug use: No    Sexual activity: Yes     Partners: Male     Other Topics Concern    None     Social History Narrative    None       Vitals:    17 0847   BP: 114/74       Review of Symptoms:  GENERAL: Denies fatigue, and malaise.   SKIN: Denies rash or lesions.   HEAD: Denies head injury or headache.   NODES: Denies enlarged lymph nodes.   CHEST: Denies chest pain or shortness of breath.   CARDIOVASCULAR: Denies palpitations or left sided chest pain.   ABDOMEN:  No abdominal pain, constipation, diarrhea, nausea, vomiting or rectal bleeding.   URINARY: No frequency, urgency, dysuria, or hematuria  MUSCULOSKELETAL: Denies joint pain or swelling.   NEUROLOGIC: Denies seizures.    O POS    ASSESSMENT    Encounter Diagnoses   Name Primary?    Normal pregnancy, third trimester Yes    36 weeks gestation of pregnancy        PLAN  1.  RTC     I have reviewed the blood pressure, urine results, fetal heart rate check, fundal height and agree.

## 2017-09-11 LAB — BACTERIA SPEC AEROBE CULT: NORMAL

## 2017-09-14 ENCOUNTER — ROUTINE PRENATAL (OUTPATIENT)
Dept: OBSTETRICS AND GYNECOLOGY | Facility: CLINIC | Age: 32
End: 2017-09-14
Payer: COMMERCIAL

## 2017-09-14 VITALS
DIASTOLIC BLOOD PRESSURE: 80 MMHG | SYSTOLIC BLOOD PRESSURE: 126 MMHG | WEIGHT: 233.25 LBS | BODY MASS INDEX: 37.65 KG/M2

## 2017-09-14 DIAGNOSIS — Z3A.37 37 WEEKS GESTATION OF PREGNANCY: Primary | ICD-10-CM

## 2017-09-14 DIAGNOSIS — Z34.93 NORMAL PREGNANCY, THIRD TRIMESTER: ICD-10-CM

## 2017-09-14 LAB
BILIRUB SERPL-MCNC: NORMAL MG/DL
BLOOD URINE, POC: NORMAL
COLOR, POC UA: YELLOW
GLUCOSE UR QL STRIP: NORMAL
KETONES UR QL STRIP: NORMAL
LEUKOCYTE ESTERASE URINE, POC: NORMAL
NITRITE, POC UA: NORMAL
PH, POC UA: 7
PROTEIN, POC: NORMAL
SPECIFIC GRAVITY, POC UA: NORMAL
UROBILINOGEN, POC UA: NORMAL

## 2017-09-14 PROCEDURE — 0502F SUBSEQUENT PRENATAL CARE: CPT | Mod: S$GLB,,, | Performed by: OBSTETRICS & GYNECOLOGY

## 2017-09-14 PROCEDURE — 99999 PR PBB SHADOW E&M-EST. PATIENT-LVL III: CPT | Mod: PBBFAC,,, | Performed by: OBSTETRICS & GYNECOLOGY

## 2017-09-14 RX ORDER — VALACYCLOVIR HYDROCHLORIDE 500 MG/1
500 TABLET, FILM COATED ORAL 3 TIMES DAILY
Qty: 90 TABLET | Refills: 1 | Status: SHIPPED | OUTPATIENT
Start: 2017-09-14 | End: 2017-09-19

## 2017-09-14 NOTE — PROGRESS NOTES
Shari Smith is a 32 y.o. female with Estimated Date of Delivery: 10/2/17   Obstetric History       T0      L0     SAB0   TAB0   Ectopic0   Multiple0   Live Births0         AT 37w3d  Here today on 2017 for   Chief Complaint   Patient presents with    Routine Prenatal Visit       Current Outpatient Prescriptions   Medication Sig Dispense Refill    PNV no.106-iron-folate #6-dha (OB COMPLETE GOLD) 27.5 mg iron- 1 mg Cap Take 1 capsule by mouth once daily. 30 capsule 11    fluticasone furoate (ARNUITY ELLIPTA) 100 mcg/actuation DsDv Inhale 100 mcg into the lungs once daily. Controller      valacyclovir (VALTREX) 500 MG tablet Take 1 tablet (500 mg total) by mouth 3 (three) times daily. 90 tablet 1     No current facility-administered medications for this visit.      Review of patient's allergies indicates:  No Known Allergies    History reviewed. No pertinent past medical history.    Past Surgical History:   Procedure Laterality Date    BREAST SURGERY Bilateral     DILATION AND CURETTAGE OF UTERUS  Age 18     for heavy bleeding    REFRACTIVE SURGERY         OB History    Para Term  AB Living   2       1     SAB TAB Ectopic Multiple Live Births   1              # Outcome Date GA Lbr Westley/2nd Weight Sex Delivery Anes PTL Lv   2 Current            1 SAB                   Social History     Social History    Marital status: Single     Spouse name: N/A    Number of children: N/A    Years of education: N/A     Social History Main Topics    Smoking status: Never Smoker    Smokeless tobacco: Never Used    Alcohol use No    Drug use: No    Sexual activity: Yes     Partners: Male     Other Topics Concern    None     Social History Narrative    None       Vitals:    17 0852   BP: 126/80       Review of Symptoms:  GENERAL: Denies fatigue, and malaise.   SKIN: Denies rash or lesions.   HEAD: Denies head injury or headache.   NODES: Denies enlarged lymph nodes.   CHEST:  Denies chest pain or shortness of breath.   CARDIOVASCULAR: Denies palpitations or left sided chest pain.   ABDOMEN: No abdominal pain, constipation, diarrhea, nausea, vomiting or rectal bleeding.   URINARY: No frequency, urgency, dysuria, or hematuria  MUSCULOSKELETAL: Denies joint pain or swelling.   NEUROLOGIC: Denies seizures.    O POS    ASSESSMENT    Encounter Diagnoses   Name Primary?    37 weeks gestation of pregnancy Yes       PLAN  1.  RTC 1 weeks     I have reviewed the blood pressure, urine results, fetal heart rate check, fundal height and agree.

## 2017-09-22 ENCOUNTER — ROUTINE PRENATAL (OUTPATIENT)
Dept: OBSTETRICS AND GYNECOLOGY | Facility: CLINIC | Age: 32
End: 2017-09-22
Payer: COMMERCIAL

## 2017-09-22 VITALS
WEIGHT: 238.13 LBS | SYSTOLIC BLOOD PRESSURE: 112 MMHG | DIASTOLIC BLOOD PRESSURE: 80 MMHG | BODY MASS INDEX: 38.43 KG/M2

## 2017-09-22 DIAGNOSIS — Z3A.38 38 WEEKS GESTATION OF PREGNANCY: Primary | ICD-10-CM

## 2017-09-22 DIAGNOSIS — Z34.93 NORMAL PREGNANCY, THIRD TRIMESTER: ICD-10-CM

## 2017-09-22 PROCEDURE — 99999 PR PBB SHADOW E&M-EST. PATIENT-LVL III: CPT | Mod: PBBFAC,,, | Performed by: OBSTETRICS & GYNECOLOGY

## 2017-09-22 PROCEDURE — 0502F SUBSEQUENT PRENATAL CARE: CPT | Mod: S$GLB,,, | Performed by: OBSTETRICS & GYNECOLOGY

## 2017-09-22 RX ORDER — VALACYCLOVIR HYDROCHLORIDE 500 MG/1
500 TABLET, FILM COATED ORAL 2 TIMES DAILY
COMMUNITY
End: 2017-11-14

## 2017-09-22 NOTE — PROGRESS NOTES
Shari Smith is a 32 y.o. female with Estimated Date of Delivery: 10/2/17   Obstetric History       T0      L0     SAB0   TAB0   Ectopic0   Multiple0   Live Births0         AT 38w4d  Here today on 2017 for   Chief Complaint   Patient presents with    Routine Prenatal Visit       Current Outpatient Prescriptions   Medication Sig Dispense Refill    PNV no.106-iron-folate #6-dha (OB COMPLETE GOLD) 27.5 mg iron- 1 mg Cap Take 1 capsule by mouth once daily. 30 capsule 11    valacyclovir (VALTREX) 500 MG tablet Take 500 mg by mouth 2 (two) times daily.      fluticasone furoate (ARNUITY ELLIPTA) 100 mcg/actuation DsDv Inhale 100 mcg into the lungs once daily. Controller       No current facility-administered medications for this visit.      Review of patient's allergies indicates:  No Known Allergies    History reviewed. No pertinent past medical history.    Past Surgical History:   Procedure Laterality Date    BREAST SURGERY Bilateral     DILATION AND CURETTAGE OF UTERUS  Age 18     for heavy bleeding    REFRACTIVE SURGERY         OB History    Para Term  AB Living   2       1     SAB TAB Ectopic Multiple Live Births   1              # Outcome Date GA Lbr Westley/2nd Weight Sex Delivery Anes PTL Lv   2 Current            1 SAB                   Social History     Social History    Marital status: Single     Spouse name: N/A    Number of children: N/A    Years of education: N/A     Social History Main Topics    Smoking status: Never Smoker    Smokeless tobacco: Never Used    Alcohol use No    Drug use: No    Sexual activity: Yes     Partners: Male     Other Topics Concern    None     Social History Narrative    None       Vitals:    17 0852   BP: 112/80       Review of Symptoms:  GENERAL: Denies fatigue, and malaise.   SKIN: Denies rash or lesions.   HEAD: Denies head injury or headache.   NODES: Denies enlarged lymph nodes.   CHEST: Denies chest pain or shortness  of breath.   CARDIOVASCULAR: Denies palpitations or left sided chest pain.   ABDOMEN: No abdominal pain, constipation, diarrhea, nausea, vomiting or rectal bleeding.   URINARY: No frequency, urgency, dysuria, or hematuria  MUSCULOSKELETAL: Denies joint pain or swelling.   NEUROLOGIC: Denies seizures.    O POS    ASSESSMENT    Encounter Diagnoses   Name Primary?    38 weeks gestation of pregnancy Yes    Normal pregnancy, third trimester        PLAN  1.  RTC 1 weeks    I have reviewed the blood pressure, urine results, fetal heart rate check, fundal height and agree.

## 2017-09-28 ENCOUNTER — ROUTINE PRENATAL (OUTPATIENT)
Dept: OBSTETRICS AND GYNECOLOGY | Facility: CLINIC | Age: 32
End: 2017-09-28
Payer: COMMERCIAL

## 2017-09-28 VITALS
WEIGHT: 235.88 LBS | SYSTOLIC BLOOD PRESSURE: 134 MMHG | BODY MASS INDEX: 38.07 KG/M2 | DIASTOLIC BLOOD PRESSURE: 88 MMHG

## 2017-09-28 DIAGNOSIS — Z3A.39 39 WEEKS GESTATION OF PREGNANCY: Primary | ICD-10-CM

## 2017-09-28 PROCEDURE — 0502F SUBSEQUENT PRENATAL CARE: CPT | Mod: S$GLB,,, | Performed by: OBSTETRICS & GYNECOLOGY

## 2017-09-28 PROCEDURE — 99999 PR PBB SHADOW E&M-EST. PATIENT-LVL III: CPT | Mod: PBBFAC,,, | Performed by: OBSTETRICS & GYNECOLOGY

## 2017-09-28 NOTE — PROGRESS NOTES
Shari Smith is a 32 y.o. female with Estimated Date of Delivery: 10/2/17   Obstetric History       T0      L0     SAB0   TAB0   Ectopic0   Multiple0   Live Births0         AT 39w3d  Here today on 2017 for   Chief Complaint   Patient presents with    Well Woman       Current Outpatient Prescriptions   Medication Sig Dispense Refill    fluticasone furoate (ARNUITY ELLIPTA) 100 mcg/actuation DsDv Inhale 100 mcg into the lungs once daily. Controller      PNV no.106-iron-folate #6-dha (OB COMPLETE GOLD) 27.5 mg iron- 1 mg Cap Take 1 capsule by mouth once daily. 30 capsule 11    valacyclovir (VALTREX) 500 MG tablet Take 500 mg by mouth 2 (two) times daily.       No current facility-administered medications for this visit.      Review of patient's allergies indicates:  No Known Allergies    History reviewed. No pertinent past medical history.    Past Surgical History:   Procedure Laterality Date    BREAST SURGERY Bilateral     DILATION AND CURETTAGE OF UTERUS  Age 18     for heavy bleeding    REFRACTIVE SURGERY         OB History    Para Term  AB Living   2       1     SAB TAB Ectopic Multiple Live Births   1              # Outcome Date GA Lbr Westley/2nd Weight Sex Delivery Anes PTL Lv   2 Current            1 SAB                   Social History     Social History    Marital status: Single     Spouse name: N/A    Number of children: N/A    Years of education: N/A     Social History Main Topics    Smoking status: Never Smoker    Smokeless tobacco: Never Used    Alcohol use No    Drug use: No    Sexual activity: Yes     Partners: Male     Other Topics Concern    None     Social History Narrative    None       Vitals:    17 0836   BP: 134/88       Review of Symptoms:  GENERAL: Denies fatigue, and malaise.   SKIN: Denies rash or lesions.   HEAD: Denies head injury or headache.   NODES: Denies enlarged lymph nodes.   CHEST: Denies chest pain or shortness of breath.    CARDIOVASCULAR: Denies palpitations or left sided chest pain.   ABDOMEN: No abdominal pain, constipation, diarrhea, nausea, vomiting or rectal bleeding.   URINARY: No frequency, urgency, dysuria, or hematuria  MUSCULOSKELETAL: Denies joint pain or swelling.   NEUROLOGIC: Denies seizures.    O POS    ASSESSMENT    Encounter Diagnoses   Name Primary?    39 weeks gestation of pregnancy Yes       PLAN  1.  RTC 1 weeks    I have reviewed the blood pressure, urine results, fetal heart rate check, fundal height and agree.

## 2017-10-02 ENCOUNTER — ROUTINE PRENATAL (OUTPATIENT)
Dept: OBSTETRICS AND GYNECOLOGY | Facility: CLINIC | Age: 32
End: 2017-10-02
Payer: COMMERCIAL

## 2017-10-02 VITALS — BODY MASS INDEX: 38.5 KG/M2 | SYSTOLIC BLOOD PRESSURE: 142 MMHG | DIASTOLIC BLOOD PRESSURE: 88 MMHG | WEIGHT: 238.56 LBS

## 2017-10-02 DIAGNOSIS — Z34.93 NORMAL PREGNANCY, THIRD TRIMESTER: Primary | ICD-10-CM

## 2017-10-02 DIAGNOSIS — O14.93 PRE-ECLAMPSIA IN THIRD TRIMESTER: ICD-10-CM

## 2017-10-02 PROBLEM — O16.3 HYPERTENSION AFFECTING PREGNANCY IN THIRD TRIMESTER: Status: ACTIVE | Noted: 2017-10-02

## 2017-10-02 PROCEDURE — 99999 PR PBB SHADOW E&M-EST. PATIENT-LVL II: CPT | Mod: PBBFAC,,, | Performed by: OBSTETRICS & GYNECOLOGY

## 2017-10-02 PROCEDURE — 0502F SUBSEQUENT PRENATAL CARE: CPT | Mod: S$GLB,,, | Performed by: OBSTETRICS & GYNECOLOGY

## 2017-10-02 NOTE — PROGRESS NOTES
Shari Smith is a 32 y.o. female with Estimated Date of Delivery: 10/2/17   Obstetric History       T0      L0     SAB0   TAB0   Ectopic0   Multiple0   Live Births0         AT 40w0d  Here today on 10/2/2017 for   Chief Complaint   Patient presents with    Routine Prenatal Visit    Swelling    Headache     yesterday and today       Current Outpatient Prescriptions   Medication Sig Dispense Refill    PNV no.106-iron-folate #6-dha (OB COMPLETE GOLD) 27.5 mg iron- 1 mg Cap Take 1 capsule by mouth once daily. 30 capsule 11    valacyclovir (VALTREX) 500 MG tablet Take 500 mg by mouth 2 (two) times daily.      fluticasone furoate (ARNUITY ELLIPTA) 100 mcg/actuation DsDv Inhale 100 mcg into the lungs once daily. Controller       No current facility-administered medications for this visit.      Review of patient's allergies indicates:  No Known Allergies    No past medical history on file.    Past Surgical History:   Procedure Laterality Date    BREAST SURGERY Bilateral     DILATION AND CURETTAGE OF UTERUS  Age 18     for heavy bleeding    REFRACTIVE SURGERY         OB History    Para Term  AB Living   2       1     SAB TAB Ectopic Multiple Live Births   1              # Outcome Date GA Lbr Westley/2nd Weight Sex Delivery Anes PTL Lv   2 Current            1 SAB                   Social History     Social History    Marital status: Single     Spouse name: N/A    Number of children: N/A    Years of education: N/A     Social History Main Topics    Smoking status: Never Smoker    Smokeless tobacco: Never Used    Alcohol use No    Drug use: No    Sexual activity: Yes     Partners: Male     Other Topics Concern    Not on file     Social History Narrative    No narrative on file       Vitals:    10/02/17 0932   BP: (!) 142/88       Review of Symptoms:  GENERAL: Denies fatigue, and malaise.   SKIN: Denies rash or lesions.   HEAD: Denies head injury or headache.   NODES: Denies  enlarged lymph nodes.   CHEST: Denies chest pain or shortness of breath.   CARDIOVASCULAR: Denies palpitations or left sided chest pain.   ABDOMEN: No abdominal pain, constipation, diarrhea, nausea, vomiting or rectal bleeding.   URINARY: No frequency, urgency, dysuria, or hematuria  MUSCULOSKELETAL: Denies joint pain or swelling.   NEUROLOGIC: Denies seizures.    O POS    ASSESSMENT    Encounter Diagnoses   Name Primary?    Normal pregnancy, third trimester Yes    Pre-eclampsia in third trimester        PLAN  1. To L&D for induction    I have reviewed the blood pressure, urine results, fetal heart rate check, fundal height and agree.

## 2017-10-05 ENCOUNTER — PATIENT MESSAGE (OUTPATIENT)
Dept: OBSTETRICS AND GYNECOLOGY | Facility: CLINIC | Age: 32
End: 2017-10-05

## 2017-10-05 DIAGNOSIS — Z32.01 PREGNANCY CONFIRMED BY POSITIVE URINE TEST: ICD-10-CM

## 2017-11-14 ENCOUNTER — POSTPARTUM VISIT (OUTPATIENT)
Dept: OBSTETRICS AND GYNECOLOGY | Facility: CLINIC | Age: 32
End: 2017-11-14
Payer: COMMERCIAL

## 2017-11-14 VITALS
SYSTOLIC BLOOD PRESSURE: 114 MMHG | DIASTOLIC BLOOD PRESSURE: 82 MMHG | BODY MASS INDEX: 33.23 KG/M2 | WEIGHT: 205.94 LBS

## 2017-11-14 PROCEDURE — 0503F POSTPARTUM CARE VISIT: CPT | Mod: S$GLB,,, | Performed by: OBSTETRICS & GYNECOLOGY

## 2017-11-14 PROCEDURE — 99999 PR PBB SHADOW E&M-EST. PATIENT-LVL III: CPT | Mod: PBBFAC,,, | Performed by: OBSTETRICS & GYNECOLOGY

## 2017-11-14 NOTE — PROGRESS NOTES
"POST-PARTUM  2017    Shari Smith presents today for postpartum care    OB History    Para Term  AB Living   2 1 1 0 1 1   SAB TAB Ectopic Multiple Live Births   1 0 0 0 1      # Outcome Date GA Lbr Westley/2nd Weight Sex Delivery Anes PTL Lv   2 Term 10/03/17 40w1d  3.997 kg (8 lb 13 oz) F Vag-Spont EPI N ROSA ISELA   1 SAB                   Past Surgical History:   Procedure Laterality Date    BREAST SURGERY Bilateral     DILATION AND CURETTAGE OF UTERUS  Age 18     for heavy bleeding    PELVIC LAPAROSCOPY      REFRACTIVE SURGERY         Past Medical History:   Diagnosis Date    Herpes simplex virus (HSV) infection     last outbreak "years ago" on valtrex since 38 weeks    Hypertension      Current Outpatient Prescriptions   Medication Sig Dispense Refill    PNV no.106-iron-folate no6-dha (OB COMPLETE GOLD) 27.5 mg iron- 1 mg Cap Take 1 capsule by mouth once daily. 30 capsule 11    fluticasone furoate (ARNUITY ELLIPTA) 100 mcg/actuation DsDv Inhale 100 mcg into the lungs once daily. Controller       No current facility-administered medications for this visit.      Review of patient's allergies indicates:  No Known Allergies  Social History     Social History    Marital status: Single     Spouse name: N/A    Number of children: N/A    Years of education: N/A     Social History Main Topics    Smoking status: Never Smoker    Smokeless tobacco: Never Used    Alcohol use No    Drug use: No    Sexual activity: Yes     Partners: Male     Other Topics Concern    None     Social History Narrative    None     /82   Wt 93.4 kg (205 lb 14.6 oz)   LMP 2016 (LMP Unknown)     PE:  General: Appears well  Neck: Supple, no lymphadenopathy or thyromegaly  Breasts:  Bilaterally symmetric, no masses, skin changes or abnormal nipple discharge. No axillary lymphadenopathy.  Abdomen: Soft, no tenderness, no distention, no hepatosplenomegaly  Extremities: No cords or " edema  Genitourinary:  External genitalia within normal limits  Vaginal mucosa moist and pink without lesions or discharge  Cervix appears without lesions, discharge or tenderness  Uterus is involuted to normal size, shape and nontender  Adnexa: no masses or tenderness    Diagnosis:  No diagnosis found.    Plan:   1.  pap smear due:  2.  desires contraception:

## 2018-02-19 ENCOUNTER — OFFICE VISIT (OUTPATIENT)
Dept: OBSTETRICS AND GYNECOLOGY | Facility: CLINIC | Age: 33
End: 2018-02-19
Payer: COMMERCIAL

## 2018-02-19 VITALS
BODY MASS INDEX: 33.84 KG/M2 | HEIGHT: 66 IN | DIASTOLIC BLOOD PRESSURE: 80 MMHG | WEIGHT: 210.56 LBS | SYSTOLIC BLOOD PRESSURE: 128 MMHG

## 2018-02-19 DIAGNOSIS — N89.8 VAGINAL DISCHARGE: ICD-10-CM

## 2018-02-19 DIAGNOSIS — Z12.4 ENCOUNTER FOR PAP SMEAR OF CERVIX WITH HPV DNA COTESTING: Primary | ICD-10-CM

## 2018-02-19 LAB
CANDIDA RRNA VAG QL PROBE: NEGATIVE
G VAGINALIS RRNA GENITAL QL PROBE: POSITIVE
T VAGINALIS RRNA GENITAL QL PROBE: NEGATIVE

## 2018-02-19 PROCEDURE — 99395 PREV VISIT EST AGE 18-39: CPT | Mod: S$GLB,,, | Performed by: OBSTETRICS & GYNECOLOGY

## 2018-02-19 PROCEDURE — 99999 PR PBB SHADOW E&M-EST. PATIENT-LVL III: CPT | Mod: PBBFAC,,, | Performed by: OBSTETRICS & GYNECOLOGY

## 2018-02-19 PROCEDURE — 88175 CYTOPATH C/V AUTO FLUID REDO: CPT

## 2018-02-19 PROCEDURE — 87480 CANDIDA DNA DIR PROBE: CPT

## 2018-02-19 PROCEDURE — 87624 HPV HI-RISK TYP POOLED RSLT: CPT

## 2018-02-19 NOTE — PROGRESS NOTES
"Chief Complaint   Patient presents with    Well Woman       History of Present Illness: Shari Smith is a 33 y.o. female that presents today 2018 for well gyn visit.    Past Medical History:   Diagnosis Date    Herpes simplex virus (HSV) infection     last outbreak "years ago" on valtrex since 38 weeks    Hypertension        Past Surgical History:   Procedure Laterality Date    BREAST SURGERY Bilateral     DILATION AND CURETTAGE OF UTERUS  Age 18     for heavy bleeding    PELVIC LAPAROSCOPY      REFRACTIVE SURGERY         Current Outpatient Prescriptions   Medication Sig Dispense Refill    PNV no.106-iron-folate no6-dha (OB COMPLETE GOLD) 27.5 mg iron- 1 mg Cap Take 1 capsule by mouth once daily. 30 capsule 11    fluticasone furoate (ARNUITY ELLIPTA) 100 mcg/actuation DsDv Inhale 100 mcg into the lungs once daily. Controller       No current facility-administered medications for this visit.        Review of patient's allergies indicates:  No Known Allergies    Family History   Problem Relation Age of Onset    Cancer Paternal Grandfather     Hypertension Father     Hyperlipidemia Father     Diabetes Father     Heart disease Father     Neuropathy Father     Sleep apnea Father     Alzheimer's disease Paternal Grandmother     Breast cancer Neg Hx     Ovarian cancer Neg Hx        Social History     Social History    Marital status: Single     Spouse name: N/A    Number of children: N/A    Years of education: N/A     Social History Main Topics    Smoking status: Never Smoker    Smokeless tobacco: Never Used    Alcohol use No    Drug use: No    Sexual activity: Yes     Partners: Male     Other Topics Concern    None     Social History Narrative    None       OB History    Para Term  AB Living   2 1 1 0 1 1   SAB TAB Ectopic Multiple Live Births   1 0 0 0 1      # Outcome Date GA Lbr Westley/2nd Weight Sex Delivery Anes PTL Lv   2 Term 10/03/17 40w1d  3.997 kg (8 lb 13 oz) F " "Vag-Spont EPI N ROSA ISELA   1 SAB                   Review of Symptoms:  GENERAL: Denies weight gain or weight loss. Feeling well overall.   SKIN: Denies rash or lesions.   HEAD: Denies head injury or headache.   NODES: Denies enlarged lymph nodes.   CHEST: Denies chest pain or shortness of breath.   CARDIOVASCULAR: Denies palpitations or left sided chest pain.   ABDOMEN: No abdominal pain, constipation, diarrhea, nausea, vomiting or rectal bleeding.   URINARY: No frequency, dysuria, hematuria, or burning on urination.  HEMATOLOGIC: No easy bruisability or excessive bleeding.   MUSCULOSKELETAL: Denies joint pain or swelling.     /80   Ht 5' 6" (1.676 m)   Wt 95.5 kg (210 lb 8.6 oz)   LMP 12/09/2017   Physical Exam:  APPEARANCE: Well nourished, well developed, in no acute distress.  SKIN: Normal skin turgor, no lesions.  NECK: Neck symmetric without masses   RESPIRATORY: Normal respiratory effort with no retractions or use of accessory muscles  CARDIOVASCULAR: Peripheral vascular system with no swelling no varicosities and palpation of pulses normal  LYMPHATIC: No enlargements of the lymph nodes noted in the neck, axillae, or groin  ABDOMEN: Soft. No tenderness or masses. No hepatosplenomegaly. No hernias.  BREASTS: Symmetrical, no skin changes or visible lesions. No palpable masses, nipple discharge or adenopathy bilaterally.  PELVIC: Normal external female genitalia without lesions. Normal hair distribution. Adequate perineal body, normal urethral meatus. Urethra with no masses.  Bladder nontender. Vagina moist and well rugated without lesions or discharge. Cervix pink and without lesions. No significant cystocele or rectocele. Bimanual exam showed uterus normal size, shape, position, mobile and nontender. Adnexa without masses or tenderness. Urethra and bladder normal.   EXTREMITIES: No clubbing cyanosis or edema.    ASSESSMENT/PLAN:  Encounter for Pap smear of cervix with HPV DNA cotesting  -     Liquid-based " pap smear, screening  -     HPV High Risk Genotypes, PCR    Vaginal discharge  -     Vaginosis Screen by DNA Probe          Patient was counseled today on Pap guidelines, recommendation for pelvic exams, mammograms every other year after the age of 40 and annually after the age of 50, Colonoscopy after the age of 50, Dexa Bone Scan and calcium and vitamin D supplementation in menopause and to see her PCP for other health maintenance.   FOLLOW-UP:prn

## 2018-02-20 ENCOUNTER — PATIENT MESSAGE (OUTPATIENT)
Dept: OBSTETRICS AND GYNECOLOGY | Facility: CLINIC | Age: 33
End: 2018-02-20

## 2018-02-20 RX ORDER — METRONIDAZOLE 500 MG/1
500 TABLET ORAL 2 TIMES DAILY
Qty: 14 TABLET | Refills: 0 | Status: SHIPPED | OUTPATIENT
Start: 2018-02-20 | End: 2018-02-27

## 2018-02-21 LAB
HPV16 AG SPEC QL: NEGATIVE
HPV16+18+H RISK 12 DNA CVX-IMP: NEGATIVE
HPV18 DNA SPEC QL NAA+PROBE: NEGATIVE

## 2018-05-01 ENCOUNTER — OFFICE VISIT (OUTPATIENT)
Dept: OBSTETRICS AND GYNECOLOGY | Facility: CLINIC | Age: 33
End: 2018-05-01
Payer: COMMERCIAL

## 2018-05-01 VITALS
BODY MASS INDEX: 32.88 KG/M2 | WEIGHT: 203.69 LBS | DIASTOLIC BLOOD PRESSURE: 84 MMHG | SYSTOLIC BLOOD PRESSURE: 130 MMHG

## 2018-05-01 DIAGNOSIS — Z32.00 POSSIBLE PREGNANCY: Primary | ICD-10-CM

## 2018-05-01 DIAGNOSIS — Z32.01 PREGNANCY CONFIRMED BY POSITIVE URINE TEST: ICD-10-CM

## 2018-05-01 LAB
B-HCG UR QL: POSITIVE
CTP QC/QA: YES

## 2018-05-01 PROCEDURE — 87086 URINE CULTURE/COLONY COUNT: CPT

## 2018-05-01 PROCEDURE — 99999 PR PBB SHADOW E&M-EST. PATIENT-LVL III: CPT | Mod: PBBFAC,,, | Performed by: OBSTETRICS & GYNECOLOGY

## 2018-05-01 PROCEDURE — 81025 URINE PREGNANCY TEST: CPT | Mod: S$GLB,,, | Performed by: OBSTETRICS & GYNECOLOGY

## 2018-05-01 PROCEDURE — 99214 OFFICE O/P EST MOD 30 MIN: CPT | Mod: 25,S$GLB,, | Performed by: OBSTETRICS & GYNECOLOGY

## 2018-05-01 PROCEDURE — 76817 TRANSVAGINAL US OBSTETRIC: CPT | Mod: S$GLB,,, | Performed by: OBSTETRICS & GYNECOLOGY

## 2018-05-01 PROCEDURE — 87491 CHLMYD TRACH DNA AMP PROBE: CPT

## 2018-05-01 NOTE — PROGRESS NOTES
"Shari Smith is a 33 y.o. female with Patient's last menstrual period was 2018. and     Estimated Date of Delivery: 18  By 7,2 weeks USG on     Unknown on Today 2018    Outpatient Medications Prior to Visit   Medication Sig Dispense Refill    PNV no.106-iron-folate no6-dha (OB COMPLETE GOLD) 27.5 mg iron- 1 mg Cap Take 1 capsule by mouth once daily. 30 capsule 11    fluticasone furoate (ARNUITY ELLIPTA) 100 mcg/actuation DsDv Inhale 100 mcg into the lungs once daily. Controller       No facility-administered medications prior to visit.        Review of patient's allergies indicates:  No Known Allergies    Past Medical History:   Diagnosis Date    Herpes simplex virus (HSV) infection     last outbreak "years ago" on valtrex since 38 weeks    Hypertension        Past Surgical History:   Procedure Laterality Date    BREAST SURGERY Bilateral     DILATION AND CURETTAGE OF UTERUS  Age 18     for heavy bleeding    PELVIC LAPAROSCOPY      REFRACTIVE SURGERY         OB History    Para Term  AB Living   2 1 1 0 1 1   SAB TAB Ectopic Multiple Live Births   1 0 0 0 1      # Outcome Date GA Lbr Westley/2nd Weight Sex Delivery Anes PTL Lv   2 Term 10/03/17 40w1d  3.997 kg (8 lb 13 oz) F Vag-Spont EPI N ROSA ISELA   1 SAB                   Family History   Problem Relation Age of Onset    Cancer Paternal Grandfather     Hypertension Father     Hyperlipidemia Father     Diabetes Father     Heart disease Father     Neuropathy Father     Sleep apnea Father     Alzheimer's disease Paternal Grandmother     Breast cancer Neg Hx     Ovarian cancer Neg Hx        Social History     Social History    Marital status: Single     Spouse name: N/A    Number of children: N/A    Years of education: N/A     Social History Main Topics    Smoking status: Never Smoker    Smokeless tobacco: Never Used    Alcohol use No    Drug use: No    Sexual activity: Yes     Partners: Male     Other Topics " Concern    None     Social History Narrative    None       Review of Symptoms:  GENERAL: Denies weight gain, weight loss, fatigue, and malaise.   SKIN: Denies rash or lesions.   HEAD: Denies head injury or headache.   NODES: Denies enlarged lymph nodes.   CHEST: Denies chest pain or shortness of breath.   CARDIOVASCULAR: Denies palpitations or left sided chest pain.   ABDOMEN: No abdominal pain, constipation, diarrhea, nausea, vomiting or rectal bleeding.   URINARY: No frequency, urgency, dysuria, or hematuria  MUSCULOSKELETAL: Denies joint pain or swelling.   NEUROLOGIC: Denies seizures.    Vitals:    05/01/18 0814   BP: 130/84     Physical Exam:   APPEARANCE: Well nourished, well developed, in no acute distress.  SKIN: Normal skin turgor, no lesions.  NECK: Neck symmetric without masses or thyromegaly.  NODES: No inguinal, cervical, axillary or femoral lymph node enlargement.  CHEST: Lungs clear to auscultation.  CARDIOVASCULAR: Normal S1, S2. No rubs, murmurs or gallops.  ABDOMEN: Soft. No tenderness or masses. No hepatosplenomegaly. No hernias.  PELVIC: Normal external female genitalia without lesions. Normal hair distribution. Adequate perineal body, normal urethral meatus. Normal palpable bladder and urethra. Vagina moist and well rugated without lesions or discharge. Cervix pink and without lesions. Bimanual exam showed uterus normal size, position, mobile and nontender. Adnexa without masses or tenderness. PAP DONE and PELVIS ADEQUATE  EXTREMITIES: NO clubbing cyanosis or edema      ULTRASOUND:   Transvaginal ultrasound performed in the usual fashion with 3.5mhz probe, showing viable pardo intrauterine pregnancy, crown-rump length = 11.7   mm with flicker,   consistent with    7,2 weeks      and EDC 12/16/18  No free fluid in cul-de-sac or adnexal pathology.    ASSESSMENT  Encounter Diagnoses   Name Primary?    Possible pregnancy Yes    Pregnancy confirmed by positive urine test        PLAN  Orders  Placed This Encounter   Procedures    C. trachomatis/N. gonorrhoeae by AMP DNA Urine    Urine culture    POCT urine pregnancy     Start prenatal vitamins  RETURN in 4 WEEKS

## 2018-05-01 NOTE — PROCEDURES
Procedures     ULTRASOUND:   Ultrasound performed in the usual fashion, showing viable pardo intrauterine pregnancy, crown-rump length =  11.7  mm with flicker,   consistent with    7,2      and EDC 12/16/18  No free fluid in cul-de-sac or adnexal pathology.

## 2018-05-02 LAB
BACTERIA UR CULT: NORMAL
BACTERIA UR CULT: NORMAL
C TRACH DNA SPEC QL NAA+PROBE: NOT DETECTED
N GONORRHOEA DNA SPEC QL NAA+PROBE: NOT DETECTED

## 2018-05-16 ENCOUNTER — PATIENT MESSAGE (OUTPATIENT)
Dept: OBSTETRICS AND GYNECOLOGY | Facility: CLINIC | Age: 33
End: 2018-05-16

## 2018-05-24 ENCOUNTER — LAB VISIT (OUTPATIENT)
Dept: LAB | Facility: HOSPITAL | Age: 33
End: 2018-05-24
Attending: OBSTETRICS & GYNECOLOGY
Payer: COMMERCIAL

## 2018-05-24 DIAGNOSIS — Z32.00 POSSIBLE PREGNANCY: ICD-10-CM

## 2018-05-24 LAB
ABO + RH BLD: NORMAL
BASOPHILS # BLD AUTO: 0.03 K/UL
BASOPHILS NFR BLD: 0.4 %
BLD GP AB SCN CELLS X3 SERPL QL: NORMAL
DIFFERENTIAL METHOD: NORMAL
EOSINOPHIL # BLD AUTO: 0.1 K/UL
EOSINOPHIL NFR BLD: 1 %
ERYTHROCYTE [DISTWIDTH] IN BLOOD BY AUTOMATED COUNT: 12.6 %
HCT VFR BLD AUTO: 42.7 %
HGB BLD-MCNC: 14.1 G/DL
HIV 1+2 AB+HIV1 P24 AG SERPL QL IA: NEGATIVE
IMM GRANULOCYTES # BLD AUTO: 0.03 K/UL
IMM GRANULOCYTES NFR BLD AUTO: 0.4 %
LYMPHOCYTES # BLD AUTO: 1.9 K/UL
LYMPHOCYTES NFR BLD: 22.4 %
MCH RBC QN AUTO: 29.1 PG
MCHC RBC AUTO-ENTMCNC: 33 G/DL
MCV RBC AUTO: 88 FL
MISCELLANEOUS TEST NAME: NORMAL
MONOCYTES # BLD AUTO: 0.5 K/UL
MONOCYTES NFR BLD: 5.7 %
NEUTROPHILS # BLD AUTO: 5.8 K/UL
NEUTROPHILS NFR BLD: 70.1 %
NRBC BLD-RTO: 0 /100 WBC
PLATELET # BLD AUTO: 214 K/UL
PMV BLD AUTO: 11.1 FL
RBC # BLD AUTO: 4.85 M/UL
TSH SERPL DL<=0.005 MIU/L-ACNC: 2.56 UIU/ML
WBC # BLD AUTO: 8.27 K/UL

## 2018-05-24 PROCEDURE — 87340 HEPATITIS B SURFACE AG IA: CPT

## 2018-05-24 PROCEDURE — 85025 COMPLETE CBC W/AUTO DIFF WBC: CPT

## 2018-05-24 PROCEDURE — 86850 RBC ANTIBODY SCREEN: CPT

## 2018-05-24 PROCEDURE — 36415 COLL VENOUS BLD VENIPUNCTURE: CPT | Mod: PO

## 2018-05-24 PROCEDURE — 86703 HIV-1/HIV-2 1 RESULT ANTBDY: CPT

## 2018-05-24 PROCEDURE — 86592 SYPHILIS TEST NON-TREP QUAL: CPT

## 2018-05-24 PROCEDURE — 84443 ASSAY THYROID STIM HORMONE: CPT

## 2018-05-24 PROCEDURE — 86762 RUBELLA ANTIBODY: CPT

## 2018-05-25 LAB
HBV SURFACE AG SERPL QL IA: NEGATIVE
RPR SER QL: NORMAL
RUBV IGG SER-ACNC: 27.5 IU/ML
RUBV IGG SER-IMP: REACTIVE

## 2018-05-28 ENCOUNTER — TELEPHONE (OUTPATIENT)
Dept: OBSTETRICS AND GYNECOLOGY | Facility: CLINIC | Age: 33
End: 2018-05-28

## 2018-05-29 ENCOUNTER — TELEPHONE (OUTPATIENT)
Dept: OBSTETRICS AND GYNECOLOGY | Facility: CLINIC | Age: 33
End: 2018-05-29

## 2018-05-29 NOTE — TELEPHONE ENCOUNTER
----- Message from Kiki Simms sent at 5/29/2018  3:52 PM CDT -----  returned call..[phone] (home)

## 2018-06-07 ENCOUNTER — ROUTINE PRENATAL (OUTPATIENT)
Dept: OBSTETRICS AND GYNECOLOGY | Facility: CLINIC | Age: 33
End: 2018-06-07
Payer: COMMERCIAL

## 2018-06-07 VITALS
BODY MASS INDEX: 34.02 KG/M2 | SYSTOLIC BLOOD PRESSURE: 118 MMHG | DIASTOLIC BLOOD PRESSURE: 74 MMHG | WEIGHT: 210.75 LBS

## 2018-06-07 DIAGNOSIS — Z3A.12 12 WEEKS GESTATION OF PREGNANCY: Primary | ICD-10-CM

## 2018-06-07 DIAGNOSIS — O09.91 HIGH-RISK PREGNANCY IN FIRST TRIMESTER: ICD-10-CM

## 2018-06-07 DIAGNOSIS — I10 ESSENTIAL HYPERTENSION: ICD-10-CM

## 2018-06-07 PROCEDURE — 99999 PR PBB SHADOW E&M-EST. PATIENT-LVL II: CPT | Mod: PBBFAC,,, | Performed by: OBSTETRICS & GYNECOLOGY

## 2018-06-07 PROCEDURE — 0502F SUBSEQUENT PRENATAL CARE: CPT | Mod: S$GLB,,, | Performed by: OBSTETRICS & GYNECOLOGY

## 2018-06-07 NOTE — PROGRESS NOTES
"Shari Smith is a 33 y.o. female with Estimated Date of Delivery: 18   Obstetric History       T1      L1     SAB1   TAB0   Ectopic0   Multiple0   Live Births1         AT 12w4d  Here today on 2018 for No chief complaint on file.      Current Outpatient Prescriptions   Medication Sig Dispense Refill    fluticasone furoate (ARNUITY ELLIPTA) 100 mcg/actuation DsDv Inhale 100 mcg into the lungs once daily. Controller      PNV #56-iron-folic acid-dha (OB COMPLETE PETITE) 35 mg iron-5 mg iron-1 mg Cap Take 1 capsule by mouth once daily. 30 capsule 11    PNV no.106-iron-folate no6-dha (OB COMPLETE GOLD) 27.5 mg iron- 1 mg Cap Take 1 capsule by mouth once daily. 30 capsule 11     No current facility-administered medications for this visit.      Review of patient's allergies indicates:  No Known Allergies    Past Medical History:   Diagnosis Date    Herpes simplex virus (HSV) infection     last outbreak "years ago" on valtrex since 38 weeks    Hypertension        Past Surgical History:   Procedure Laterality Date    BREAST SURGERY Bilateral     DILATION AND CURETTAGE OF UTERUS  Age 18     for heavy bleeding    PELVIC LAPAROSCOPY      REFRACTIVE SURGERY         OB History    Para Term  AB Living   3 1 1 0 1 1   SAB TAB Ectopic Multiple Live Births   1 0 0 0 1      # Outcome Date GA Lbr Westley/2nd Weight Sex Delivery Anes PTL Lv   3 Current            2 Term 10/03/17 40w1d  3.997 kg (8 lb 13 oz) F Vag-Spont EPI N ROSA ISELA   1 SAB                   Social History     Social History    Marital status: Single     Spouse name: N/A    Number of children: N/A    Years of education: N/A     Social History Main Topics    Smoking status: Never Smoker    Smokeless tobacco: Never Used    Alcohol use No    Drug use: No    Sexual activity: Yes     Partners: Male     Other Topics Concern    Not on file     Social History Narrative    No narrative on file       Vitals:    18 1002 "   BP: 118/74       Review of Symptoms:  GENERAL: Denies fatigue, and malaise.   SKIN: Denies rash or lesions.   HEAD: Denies head injury or headache.   NODES: Denies enlarged lymph nodes.   CHEST: Denies chest pain or shortness of breath.   CARDIOVASCULAR: Denies palpitations or left sided chest pain.   ABDOMEN: No abdominal pain, constipation, diarrhea, nausea, vomiting or rectal bleeding.   URINARY: No frequency, urgency, dysuria, or hematuria  MUSCULOSKELETAL: Denies joint pain or swelling.   NEUROLOGIC: Denies seizures.    O POS    ASSESSMENT    Encounter Diagnoses   Name Primary?    12 weeks gestation of pregnancy Yes    High-risk pregnancy in first trimester     Essential hypertension        PLAN  1.  RTC 4 weeks    I have reviewed the blood pressure, urine results, fetal heart rate check, fundal height and agree.

## 2018-07-05 ENCOUNTER — ROUTINE PRENATAL (OUTPATIENT)
Dept: OBSTETRICS AND GYNECOLOGY | Facility: CLINIC | Age: 33
End: 2018-07-05
Payer: COMMERCIAL

## 2018-07-05 VITALS
DIASTOLIC BLOOD PRESSURE: 72 MMHG | WEIGHT: 213.38 LBS | BODY MASS INDEX: 34.44 KG/M2 | SYSTOLIC BLOOD PRESSURE: 108 MMHG

## 2018-07-05 DIAGNOSIS — I10 ESSENTIAL HYPERTENSION: ICD-10-CM

## 2018-07-05 DIAGNOSIS — O09.92 HIGH-RISK PREGNANCY IN SECOND TRIMESTER: Primary | ICD-10-CM

## 2018-07-05 DIAGNOSIS — Z3A.16 16 WEEKS GESTATION OF PREGNANCY: ICD-10-CM

## 2018-07-05 LAB
BILIRUB SERPL-MCNC: ABNORMAL MG/DL
BLOOD URINE, POC: ABNORMAL
COLOR, POC UA: ABNORMAL
GLUCOSE UR QL STRIP: ABNORMAL
KETONES UR QL STRIP: ABNORMAL
LEUKOCYTE ESTERASE URINE, POC: ABNORMAL
NITRITE, POC UA: ABNORMAL
PH, POC UA: 5
PROTEIN, POC: ABNORMAL
SPECIFIC GRAVITY, POC UA: ABNORMAL
UROBILINOGEN, POC UA: ABNORMAL

## 2018-07-05 PROCEDURE — 99999 PR PBB SHADOW E&M-EST. PATIENT-LVL II: CPT | Mod: PBBFAC,,, | Performed by: OBSTETRICS & GYNECOLOGY

## 2018-07-05 PROCEDURE — 0502F SUBSEQUENT PRENATAL CARE: CPT | Mod: S$GLB,,, | Performed by: OBSTETRICS & GYNECOLOGY

## 2018-07-05 NOTE — PROGRESS NOTES
"Shari Smith is a 33 y.o. female with Estimated Date of Delivery: 18   Obstetric History       T1      L1     SAB1   TAB0   Ectopic0   Multiple0   Live Births1         AT 16w4d  Here today on 2018 for No chief complaint on file.      Current Outpatient Prescriptions   Medication Sig Dispense Refill    fluticasone furoate (ARNUITY ELLIPTA) 100 mcg/actuation DsDv Inhale 100 mcg into the lungs once daily. Controller      PNV #56-iron-folic acid-dha (OB COMPLETE PETITE) 35 mg iron-5 mg iron-1 mg Cap Take 1 capsule by mouth once daily. 30 capsule 11    PNV no.106-iron-folate no6-dha (OB COMPLETE GOLD) 27.5 mg iron- 1 mg Cap Take 1 capsule by mouth once daily. 30 capsule 11     No current facility-administered medications for this visit.      Review of patient's allergies indicates:  No Known Allergies    Past Medical History:   Diagnosis Date    Herpes simplex virus (HSV) infection     last outbreak "years ago" on valtrex since 38 weeks    Hypertension        Past Surgical History:   Procedure Laterality Date    BREAST SURGERY Bilateral     DILATION AND CURETTAGE OF UTERUS  Age 18     for heavy bleeding    PELVIC LAPAROSCOPY      REFRACTIVE SURGERY         OB History    Para Term  AB Living   3 1 1 0 1 1   SAB TAB Ectopic Multiple Live Births   1 0 0 0 1      # Outcome Date GA Lbr [place]/2nd Weight Sex Delivery Anes PTL Lv   3 Current            2 Term 10/03/17 40w1d  3.997 kg (8 lb 13 oz) F Vag-Spont EPI N ROSA ISELA   1 SAB                   Social History     Social History    Marital status: Single     Spouse name: N/A    Number of children: N/A    Years of education: N/A     Social History Main Topics    Smoking status: Never Smoker    Smokeless tobacco: Never Used    Alcohol use No    Drug use: No    Sexual activity: Yes     Partners: Male     Other Topics Concern    Not on file     Social History Narrative    No narrative on file       Vitals:    18 0919 "   BP: 108/72       Review of Symptoms:  GENERAL: Denies fatigue, and malaise.   SKIN: Denies rash or lesions.   HEAD: Denies head injury or headache.   NODES: Denies enlarged lymph nodes.   CHEST: Denies chest pain or shortness of breath.   CARDIOVASCULAR: Denies palpitations or left sided chest pain.   ABDOMEN: No abdominal pain, constipation, diarrhea, nausea, vomiting or rectal bleeding.   URINARY: No frequency, urgency, dysuria, or hematuria  MUSCULOSKELETAL: Denies joint pain or swelling.   NEUROLOGIC: Denies seizures.    O POS    ASSESSMENT    Encounter Diagnoses   Name Primary?    High-risk pregnancy in second trimester Yes    16 weeks gestation of pregnancy     Essential hypertension        PLAN  1.  RTC 3 weeks     I have reviewed the blood pressure, urine results, fetal heart rate check, fundal height and agree.

## 2018-07-06 ENCOUNTER — TELEPHONE (OUTPATIENT)
Dept: OBSTETRICS AND GYNECOLOGY | Facility: CLINIC | Age: 33
End: 2018-07-06

## 2018-07-06 NOTE — TELEPHONE ENCOUNTER
Phone call from patient states has been vomiting all day, unable to keep anything down, with increased pain to left lower abdomen-recommended eval at ED verbalized understanding

## 2018-07-12 ENCOUNTER — PATIENT MESSAGE (OUTPATIENT)
Dept: OBSTETRICS AND GYNECOLOGY | Facility: CLINIC | Age: 33
End: 2018-07-12

## 2018-07-26 ENCOUNTER — PATIENT MESSAGE (OUTPATIENT)
Dept: OBSTETRICS AND GYNECOLOGY | Facility: CLINIC | Age: 33
End: 2018-07-26

## 2018-07-26 ENCOUNTER — TELEPHONE (OUTPATIENT)
Dept: OBSTETRICS AND GYNECOLOGY | Facility: CLINIC | Age: 33
End: 2018-07-26

## 2018-07-26 NOTE — TELEPHONE ENCOUNTER
----- Message from Amari Corbin sent at 7/26/2018  2:27 PM CDT -----  Contact: Pt  Pt states she is supposed to have an appointment with Dr Arzate on 8.2.18 at 2 pm but is scheduled with Dr Wagoner for that time slot.  Pt is unable to change her appointment for the correct Dr because it is unavailable.  Please call pt and advise.    Call Back #: [phone] (work) or  (cell)  Thanks

## 2018-07-26 NOTE — TELEPHONE ENCOUNTER
----- Message from Amari Corbin sent at 7/26/2018  2:27 PM CDT -----  Contact: Pt  Pt states she is supposed to have an appointment with Dr Arzate on 8.2.18 at 2 pm but is scheduled with Dr Wagoner for that time slot.  Pt is unable to change her appointment for the correct Dr because it is unavailable.  Please call pt and advise.    Call Back #: [phone] (work) or  (cell)  Thanks

## 2018-07-26 NOTE — TELEPHONE ENCOUNTER
I called the patient a left a message letting the patient know why the appointment was scheduled with Dr. Wagoner.

## 2018-08-02 ENCOUNTER — HOSPITAL ENCOUNTER (OUTPATIENT)
Dept: RADIOLOGY | Facility: HOSPITAL | Age: 33
Discharge: HOME | End: 2018-08-02
Attending: OBSTETRICS & GYNECOLOGY
Payer: COMMERCIAL

## 2018-08-02 ENCOUNTER — ROUTINE PRENATAL (OUTPATIENT)
Dept: OBSTETRICS AND GYNECOLOGY | Facility: CLINIC | Age: 33
End: 2018-08-02
Payer: COMMERCIAL

## 2018-08-02 VITALS
SYSTOLIC BLOOD PRESSURE: 102 MMHG | DIASTOLIC BLOOD PRESSURE: 68 MMHG | WEIGHT: 220.25 LBS | BODY MASS INDEX: 35.55 KG/M2

## 2018-08-02 DIAGNOSIS — Z3A.16 16 WEEKS GESTATION OF PREGNANCY: ICD-10-CM

## 2018-08-02 DIAGNOSIS — Z3A.20 20 WEEKS GESTATION OF PREGNANCY: Primary | ICD-10-CM

## 2018-08-02 PROCEDURE — 76805 OB US >/= 14 WKS SNGL FETUS: CPT | Mod: 26,,, | Performed by: RADIOLOGY

## 2018-08-02 PROCEDURE — 99999 PR PBB SHADOW E&M-EST. PATIENT-LVL II: CPT | Mod: PBBFAC,,, | Performed by: SPECIALIST

## 2018-08-02 PROCEDURE — 76805 OB US >/= 14 WKS SNGL FETUS: CPT | Mod: TC,PN

## 2018-08-02 PROCEDURE — 0502F SUBSEQUENT PRENATAL CARE: CPT | Mod: S$GLB,,, | Performed by: SPECIALIST

## 2018-08-02 NOTE — PROGRESS NOTES
Pt Dr Arzate returns for continued PNC  Discussed fetal kick counts at 24 weeks as well as GD screen 27-28 weeks  I reviewed pt's past medical history, past and current meds, family history, allergies and reviewed problem list  RTO 4 weeks Dr Arzate

## 2018-08-30 ENCOUNTER — ROUTINE PRENATAL (OUTPATIENT)
Dept: OBSTETRICS AND GYNECOLOGY | Facility: CLINIC | Age: 33
End: 2018-08-30
Payer: COMMERCIAL

## 2018-08-30 VITALS — DIASTOLIC BLOOD PRESSURE: 72 MMHG | SYSTOLIC BLOOD PRESSURE: 114 MMHG | BODY MASS INDEX: 35.83 KG/M2 | WEIGHT: 222 LBS

## 2018-08-30 DIAGNOSIS — I10 ESSENTIAL HYPERTENSION: ICD-10-CM

## 2018-08-30 DIAGNOSIS — Z3A.24 24 WEEKS GESTATION OF PREGNANCY: ICD-10-CM

## 2018-08-30 DIAGNOSIS — O09.92 HIGH-RISK PREGNANCY IN SECOND TRIMESTER: Primary | ICD-10-CM

## 2018-08-30 PROCEDURE — 0502F SUBSEQUENT PRENATAL CARE: CPT | Mod: S$GLB,,, | Performed by: OBSTETRICS & GYNECOLOGY

## 2018-08-30 PROCEDURE — 99999 PR PBB SHADOW E&M-EST. PATIENT-LVL III: CPT | Mod: PBBFAC,,, | Performed by: OBSTETRICS & GYNECOLOGY

## 2018-08-30 NOTE — PROGRESS NOTES
"Shari Smith is a 33 y.o. female with Estimated Date of Delivery: 18   Obstetric History       T1      L1     SAB1   TAB0   Ectopic0   Multiple0   Live Births1         AT 24w4d  Here today on 2018 for   Chief Complaint   Patient presents with    Routine Prenatal Visit       Current Outpatient Medications   Medication Sig Dispense Refill    doxylamine-pyridoxine, vit B6, 10-10 mg TbEC Take 2 tablets by mouth every evening. 28 tablet 0    fluticasone furoate (ARNUITY ELLIPTA) 100 mcg/actuation DsDv Inhale 100 mcg into the lungs once daily. Controller      PNV #56-iron-folic acid-dha (OB COMPLETE PETITE) 35 mg iron-5 mg iron-1 mg Cap Take 1 capsule by mouth once daily. 30 capsule 11    PNV no.106-iron-folate no6-dha (OB COMPLETE GOLD) 27.5 mg iron- 1 mg Cap Take 1 capsule by mouth once daily. 30 capsule 11     No current facility-administered medications for this visit.      Review of patient's allergies indicates:  No Known Allergies    Past Medical History:   Diagnosis Date    Herpes simplex virus (HSV) infection     last outbreak "years ago" on valtrex since 38 weeks    Hypertension        Past Surgical History:   Procedure Laterality Date    BREAST SURGERY Bilateral     DILATION AND CURETTAGE OF UTERUS  Age 18     for heavy bleeding    PELVIC LAPAROSCOPY      REFRACTIVE SURGERY         OB History    Para Term  AB Living   3 1 1 0 1 1   SAB TAB Ectopic Multiple Live Births   1 0 0 0 1      # Outcome Date GA Lbr Westley/2nd Weight Sex Delivery Anes PTL Lv   3 Current            2 Term 10/03/17 40w1d  3.997 kg (8 lb 13 oz) F Vag-Spont EPI N ROSA ISELA   1 SAB                   Social History     Socioeconomic History    Marital status: Single     Spouse name: Not on file    Number of children: Not on file    Years of education: Not on file    Highest education level: Not on file   Social Needs    Financial resource strain: Not on file    Food insecurity - worry: Not " on file    Food insecurity - inability: Not on file    Transportation needs - medical: Not on file    Transportation needs - non-medical: Not on file   Occupational History    Not on file   Tobacco Use    Smoking status: Never Smoker    Smokeless tobacco: Never Used   Substance and Sexual Activity    Alcohol use: No    Drug use: No    Sexual activity: Yes     Partners: Male   Other Topics Concern    Not on file   Social History Narrative    Not on file       Vitals:    08/30/18 1055   BP: 114/72       Review of Symptoms:  GENERAL: Denies fatigue, and malaise.   SKIN: Denies rash or lesions.   HEAD: Denies head injury or headache.   NODES: Denies enlarged lymph nodes.   CHEST: Denies chest pain or shortness of breath.   CARDIOVASCULAR: Denies palpitations or left sided chest pain.   ABDOMEN: No abdominal pain, constipation, diarrhea, nausea, vomiting or rectal bleeding.   URINARY: No frequency, urgency, dysuria, or hematuria  MUSCULOSKELETAL: Denies joint pain or swelling.   NEUROLOGIC: Denies seizures.    O POS    ASSESSMENT    Encounter Diagnoses   Name Primary?    High-risk pregnancy in second trimester Yes    Essential hypertension     24 weeks gestation of pregnancy        PLAN  1.  RTC 4 weeks     I have reviewed the blood pressure, urine results, fetal heart rate check, fundal height and agree.

## 2018-08-30 NOTE — LETTER
August 30, 2018                 [facility]  Obstetrics and Gynecology  [address], [address]  [place] [place] [address]  Phone: [phone]  Fax: [phone]   August 30, 2018     Patient: Shari Smith   YOB: 1985   Date of Visit: 8/30/2018       To Whom it May Concern:    Shari Smith was seen in my clinic on 8/30/2018. She is currently under my care as an obstetrical patient, her due date is 12/16/2018. Please allow Shari to work light duty only for the remainder of her pregnancy.    If you have any questions or concerns, please don't hesitate to call.    Sincerely,         Dr. Mei Arzate MD

## 2018-09-27 ENCOUNTER — PATIENT MESSAGE (OUTPATIENT)
Dept: OBSTETRICS AND GYNECOLOGY | Facility: CLINIC | Age: 33
End: 2018-09-27

## 2018-10-01 ENCOUNTER — ROUTINE PRENATAL (OUTPATIENT)
Dept: OBSTETRICS AND GYNECOLOGY | Facility: CLINIC | Age: 33
End: 2018-10-01
Payer: COMMERCIAL

## 2018-10-01 ENCOUNTER — LAB VISIT (OUTPATIENT)
Dept: LAB | Facility: HOSPITAL | Age: 33
End: 2018-10-01
Attending: OBSTETRICS & GYNECOLOGY
Payer: COMMERCIAL

## 2018-10-01 VITALS
DIASTOLIC BLOOD PRESSURE: 68 MMHG | SYSTOLIC BLOOD PRESSURE: 124 MMHG | BODY MASS INDEX: 36.97 KG/M2 | WEIGHT: 229.06 LBS

## 2018-10-01 DIAGNOSIS — I10 ESSENTIAL HYPERTENSION: ICD-10-CM

## 2018-10-01 DIAGNOSIS — Z3A.29 29 WEEKS GESTATION OF PREGNANCY: ICD-10-CM

## 2018-10-01 DIAGNOSIS — B00.9 HERPES INFECTION: ICD-10-CM

## 2018-10-01 DIAGNOSIS — O09.92 HIGH-RISK PREGNANCY IN SECOND TRIMESTER: ICD-10-CM

## 2018-10-01 DIAGNOSIS — O09.93 HIGH-RISK PREGNANCY IN THIRD TRIMESTER: Primary | ICD-10-CM

## 2018-10-01 LAB
ALBUMIN SERPL BCP-MCNC: 2.6 G/DL
ALP SERPL-CCNC: 60 U/L
ALT SERPL W/O P-5'-P-CCNC: 9 U/L
ANION GAP SERPL CALC-SCNC: 8 MMOL/L
AST SERPL-CCNC: 11 U/L
BILIRUB SERPL-MCNC: 0.3 MG/DL
BUN SERPL-MCNC: 5 MG/DL
CALCIUM SERPL-MCNC: 8.3 MG/DL
CHLORIDE SERPL-SCNC: 104 MMOL/L
CO2 SERPL-SCNC: 21 MMOL/L
CREAT CL/1.73 SQ M 12H UR+SERPL-ARVRAT: 235 ML/MIN
CREAT SERPL-MCNC: 0.6 MG/DL
CREAT SERPL-MCNC: 0.6 MG/DL
CREAT UR-MCNC: 193 MG/DL
CREATININE, URINE (MG/SPEC): 2026.5 MG/SPEC
EST. GFR  (AFRICAN AMERICAN): >60 ML/MIN/1.73 M^2
EST. GFR  (NON AFRICAN AMERICAN): >60 ML/MIN/1.73 M^2
GLUCOSE SERPL-MCNC: 156 MG/DL
GLUCOSE SERPL-MCNC: 161 MG/DL
POTASSIUM SERPL-SCNC: 3.2 MMOL/L
PROT 24H UR-MRATE: 147 MG/SPEC
PROT SERPL-MCNC: 6.3 G/DL
PROT UR-MCNC: 14 MG/DL
SODIUM SERPL-SCNC: 133 MMOL/L
URINE COLLECTION DURATION: 24 HR
URINE COLLECTION DURATION: 24 HR
URINE VOLUME: 1050 ML
URINE VOLUME: 1050 ML

## 2018-10-01 PROCEDURE — 82575 CREATININE CLEARANCE TEST: CPT

## 2018-10-01 PROCEDURE — 99999 PR PBB SHADOW E&M-EST. PATIENT-LVL III: CPT | Mod: PBBFAC,,, | Performed by: OBSTETRICS & GYNECOLOGY

## 2018-10-01 PROCEDURE — 82950 GLUCOSE TEST: CPT

## 2018-10-01 PROCEDURE — 0502F SUBSEQUENT PRENATAL CARE: CPT | Mod: S$GLB,,, | Performed by: OBSTETRICS & GYNECOLOGY

## 2018-10-01 PROCEDURE — 36415 COLL VENOUS BLD VENIPUNCTURE: CPT | Mod: PO

## 2018-10-01 PROCEDURE — 80053 COMPREHEN METABOLIC PANEL: CPT

## 2018-10-01 PROCEDURE — 84156 ASSAY OF PROTEIN URINE: CPT

## 2018-10-01 NOTE — PROGRESS NOTES
"Shari Smith is a 33 y.o. female with Estimated Date of Delivery: 18   Obstetric History       T1      L1     SAB1   TAB0   Ectopic0   Multiple0   Live Births1         AT 29w1d  Here today on 10/1/2018 for   Chief Complaint   Patient presents with    Routine Prenatal Visit     vaginal irritation       Current Outpatient Medications   Medication Sig Dispense Refill    doxylamine-pyridoxine, vit B6, 10-10 mg TbEC Take 2 tablets by mouth every evening. 28 tablet 0    fluticasone furoate (ARNUITY ELLIPTA) 100 mcg/actuation DsDv Inhale 100 mcg into the lungs once daily. Controller      PNV #56-iron-folic acid-dha (OB COMPLETE PETITE) 35 mg iron-5 mg iron-1 mg Cap Take 1 capsule by mouth once daily. 30 capsule 11    PNV no.106-iron-folate no6-dha (OB COMPLETE GOLD) 27.5 mg iron- 1 mg Cap Take 1 capsule by mouth once daily. 30 capsule 11     No current facility-administered medications for this visit.      Review of patient's allergies indicates:  No Known Allergies    Past Medical History:   Diagnosis Date    Herpes simplex virus (HSV) infection     last outbreak "years ago" on valtrex since 38 weeks    Hypertension        Past Surgical History:   Procedure Laterality Date    BREAST SURGERY Bilateral 2011    DILATION AND CURETTAGE OF UTERUS  Age 18     for heavy bleeding    PELVIC LAPAROSCOPY      REFRACTIVE SURGERY         OB History    Para Term  AB Living   3 1 1 0 1 1   SAB TAB Ectopic Multiple Live Births   1 0 0 0 1      # Outcome Date GA Lbr Westley/2nd Weight Sex Delivery Anes PTL Lv   3 Current            2 Term 10/03/17 40w1d  3.997 kg (8 lb 13 oz) F Vag-Spont EPI N ROSA ISELA   1 SAB                   Social History     Socioeconomic History    Marital status: Single     Spouse name: None    Number of children: None    Years of education: None    Highest education level: None   Social Needs    Financial resource strain: None    Food insecurity - worry: None    Food " insecurity - inability: None    Transportation needs - medical: None    Transportation needs - non-medical: None   Occupational History    None   Tobacco Use    Smoking status: Never Smoker    Smokeless tobacco: Never Used   Substance and Sexual Activity    Alcohol use: No    Drug use: No    Sexual activity: Yes     Partners: Male   Other Topics Concern    None   Social History Narrative    None       Vitals:    10/01/18 1048   BP: 124/68       Review of Symptoms:  GENERAL: Denies fatigue, and malaise.   SKIN: Denies rash or lesions.   HEAD: Denies head injury or headache.   NODES: Denies enlarged lymph nodes.   CHEST: Denies chest pain or shortness of breath.   CARDIOVASCULAR: Denies palpitations or left sided chest pain.   ABDOMEN: No abdominal pain, constipation, diarrhea, nausea, vomiting or rectal bleeding.   URINARY: No frequency, urgency, dysuria, or hematuria  MUSCULOSKELETAL: Denies joint pain or swelling.   NEUROLOGIC: Denies seizures.    O POS    ASSESSMENT    Encounter Diagnoses   Name Primary?    29 weeks gestation of pregnancy Yes    High-risk pregnancy in third trimester     Essential hypertension     Herpes infection-2 outbreaks lifetime        PLAN  1.  RTC     I have reviewed the blood pressure, urine results, fetal heart rate check, fundal height and agree.

## 2018-10-02 DIAGNOSIS — O99.810 ABNORMAL GLUCOSE TOLERANCE IN PREGNANCY: Primary | ICD-10-CM

## 2018-10-10 ENCOUNTER — LAB VISIT (OUTPATIENT)
Dept: LAB | Facility: HOSPITAL | Age: 33
End: 2018-10-10
Attending: OBSTETRICS & GYNECOLOGY
Payer: COMMERCIAL

## 2018-10-10 DIAGNOSIS — O99.810 ABNORMAL GLUCOSE TOLERANCE IN PREGNANCY: ICD-10-CM

## 2018-10-10 LAB
GLUCOSE SERPL-MCNC: 162 MG/DL
GLUCOSE SERPL-MCNC: 164 MG/DL
GLUCOSE SERPL-MCNC: 77 MG/DL
GLUCOSE SERPL-MCNC: 94 MG/DL

## 2018-10-10 PROCEDURE — 82952 GTT-ADDED SAMPLES: CPT

## 2018-10-10 PROCEDURE — 36415 COLL VENOUS BLD VENIPUNCTURE: CPT | Mod: PO

## 2018-10-10 PROCEDURE — 82951 GLUCOSE TOLERANCE TEST (GTT): CPT

## 2018-10-12 ENCOUNTER — PATIENT MESSAGE (OUTPATIENT)
Dept: OBSTETRICS AND GYNECOLOGY | Facility: CLINIC | Age: 33
End: 2018-10-12

## 2018-10-25 ENCOUNTER — ROUTINE PRENATAL (OUTPATIENT)
Dept: OBSTETRICS AND GYNECOLOGY | Facility: CLINIC | Age: 33
End: 2018-10-25
Payer: COMMERCIAL

## 2018-10-25 VITALS — SYSTOLIC BLOOD PRESSURE: 122 MMHG | DIASTOLIC BLOOD PRESSURE: 70 MMHG | WEIGHT: 231.5 LBS | BODY MASS INDEX: 37.36 KG/M2

## 2018-10-25 DIAGNOSIS — Z23 NEED FOR TDAP VACCINATION: ICD-10-CM

## 2018-10-25 DIAGNOSIS — B00.9 HERPES INFECTION: ICD-10-CM

## 2018-10-25 DIAGNOSIS — I10 ESSENTIAL HYPERTENSION: ICD-10-CM

## 2018-10-25 DIAGNOSIS — Z23 NEED FOR INFLUENZA VACCINATION: ICD-10-CM

## 2018-10-25 DIAGNOSIS — Z3A.32 32 WEEKS GESTATION OF PREGNANCY: ICD-10-CM

## 2018-10-25 DIAGNOSIS — O09.93 HIGH-RISK PREGNANCY IN THIRD TRIMESTER: Primary | ICD-10-CM

## 2018-10-25 LAB
BILIRUB SERPL-MCNC: NORMAL MG/DL
BLOOD URINE, POC: NORMAL
COLOR, POC UA: NORMAL
GLUCOSE UR QL STRIP: NORMAL
KETONES UR QL STRIP: NORMAL
LEUKOCYTE ESTERASE URINE, POC: NORMAL
NITRITE, POC UA: NORMAL
PH, POC UA: 6
PROTEIN, POC: NORMAL
SPECIFIC GRAVITY, POC UA: NORMAL
UROBILINOGEN, POC UA: NORMAL

## 2018-10-25 PROCEDURE — 99999 PR PBB SHADOW E&M-EST. PATIENT-LVL III: CPT | Mod: PBBFAC,,, | Performed by: OBSTETRICS & GYNECOLOGY

## 2018-10-25 PROCEDURE — 90471 IMMUNIZATION ADMIN: CPT | Mod: S$GLB,,, | Performed by: OBSTETRICS & GYNECOLOGY

## 2018-10-25 PROCEDURE — 90472 IMMUNIZATION ADMIN EACH ADD: CPT | Mod: S$GLB,,, | Performed by: OBSTETRICS & GYNECOLOGY

## 2018-10-25 PROCEDURE — 90715 TDAP VACCINE 7 YRS/> IM: CPT | Mod: S$GLB,,, | Performed by: OBSTETRICS & GYNECOLOGY

## 2018-10-25 PROCEDURE — 0502F SUBSEQUENT PRENATAL CARE: CPT | Mod: S$GLB,,, | Performed by: OBSTETRICS & GYNECOLOGY

## 2018-10-25 PROCEDURE — 90686 IIV4 VACC NO PRSV 0.5 ML IM: CPT | Mod: S$GLB,,, | Performed by: OBSTETRICS & GYNECOLOGY

## 2018-10-25 NOTE — PROGRESS NOTES
"Shari Smith is a 33 y.o. female with Estimated Date of Delivery: 18   Obstetric History       T1      L1     SAB1   TAB0   Ectopic0   Multiple0   Live Births1         AT 32w4d  Here today on 10/25/2018 for   Chief Complaint   Patient presents with    Routine Prenatal Visit       Current Outpatient Medications   Medication Sig Dispense Refill    doxylamine-pyridoxine, vit B6, 10-10 mg TbEC Take 2 tablets by mouth every evening. 28 tablet 0    fluticasone furoate (ARNUITY ELLIPTA) 100 mcg/actuation DsDv Inhale 100 mcg into the lungs once daily. Controller      PNV #56-iron-folic acid-dha (OB COMPLETE PETITE) 35 mg iron-5 mg iron-1 mg Cap Take 1 capsule by mouth once daily. 30 capsule 11    PNV no.106-iron-folate no6-dha (OB COMPLETE GOLD) 27.5 mg iron- 1 mg Cap Take 1 capsule by mouth once daily. 30 capsule 11     No current facility-administered medications for this visit.      Review of patient's allergies indicates:  No Known Allergies    Past Medical History:   Diagnosis Date    Herpes simplex virus (HSV) infection     last outbreak "years ago" on valtrex since 38 weeks    Hypertension        Past Surgical History:   Procedure Laterality Date    BREAST SURGERY Bilateral     DILATION AND CURETTAGE OF UTERUS  Age 18     for heavy bleeding    PELVIC LAPAROSCOPY      REFRACTIVE SURGERY         OB History    Para Term  AB Living   3 1 1 0 1 1   SAB TAB Ectopic Multiple Live Births   1 0 0 0 1      # Outcome Date GA Lbr Westley/2nd Weight Sex Delivery Anes PTL Lv   3 Current            2 Term 10/03/17 40w1d  3.997 kg (8 lb 13 oz) F Vag-Spont EPI N ROSA ISELA   1 SAB                   Social History     Socioeconomic History    Marital status: Single     Spouse name: None    Number of children: None    Years of education: None    Highest education level: None   Social Needs    Financial resource strain: None    Food insecurity - worry: None    Food insecurity - inability: " None    Transportation needs - medical: None    Transportation needs - non-medical: None   Occupational History    None   Tobacco Use    Smoking status: Never Smoker    Smokeless tobacco: Never Used   Substance and Sexual Activity    Alcohol use: No    Drug use: No    Sexual activity: Yes     Partners: Male   Other Topics Concern    None   Social History Narrative    None       Vitals:    10/25/18 0945   BP: 122/70       Review of Symptoms:  GENERAL: Denies fatigue, and malaise.   SKIN: Denies rash or lesions.   HEAD: Denies head injury or headache.   NODES: Denies enlarged lymph nodes.   CHEST: Denies chest pain or shortness of breath.   CARDIOVASCULAR: Denies palpitations or left sided chest pain.   ABDOMEN: No abdominal pain, constipation, diarrhea, nausea, vomiting or rectal bleeding.   URINARY: No frequency, urgency, dysuria, or hematuria  MUSCULOSKELETAL: Denies joint pain or swelling.   NEUROLOGIC: Denies seizures.    O POS    ASSESSMENT    Encounter Diagnoses   Name Primary?    High-risk pregnancy in third trimester Yes    32 weeks gestation of pregnancy     Essential hypertension     Herpes infection-2 outbreaks lifetime        PLAN  1.  RTC 2 weeks     I have reviewed the blood pressure, urine results, fetal heart rate check, fundal height and agree.

## 2018-11-08 ENCOUNTER — ROUTINE PRENATAL (OUTPATIENT)
Dept: OBSTETRICS AND GYNECOLOGY | Facility: CLINIC | Age: 33
End: 2018-11-08
Payer: COMMERCIAL

## 2018-11-08 ENCOUNTER — HOSPITAL ENCOUNTER (OUTPATIENT)
Dept: RADIOLOGY | Facility: HOSPITAL | Age: 33
Discharge: HOME | End: 2018-11-08
Attending: OBSTETRICS & GYNECOLOGY
Payer: COMMERCIAL

## 2018-11-08 VITALS — BODY MASS INDEX: 37.36 KG/M2 | DIASTOLIC BLOOD PRESSURE: 80 MMHG | SYSTOLIC BLOOD PRESSURE: 116 MMHG | WEIGHT: 231.5 LBS

## 2018-11-08 DIAGNOSIS — Z3A.34 34 WEEKS GESTATION OF PREGNANCY: Primary | ICD-10-CM

## 2018-11-08 DIAGNOSIS — O09.93 HIGH-RISK PREGNANCY IN THIRD TRIMESTER: ICD-10-CM

## 2018-11-08 PROCEDURE — 76816 OB US FOLLOW-UP PER FETUS: CPT | Mod: 26,,, | Performed by: RADIOLOGY

## 2018-11-08 PROCEDURE — 99999 PR PBB SHADOW E&M-EST. PATIENT-LVL III: CPT | Mod: PBBFAC,,, | Performed by: OBSTETRICS & GYNECOLOGY

## 2018-11-08 PROCEDURE — 0502F SUBSEQUENT PRENATAL CARE: CPT | Mod: S$GLB,,, | Performed by: OBSTETRICS & GYNECOLOGY

## 2018-11-08 PROCEDURE — 76816 OB US FOLLOW-UP PER FETUS: CPT | Mod: TC,PN

## 2018-11-08 NOTE — PROGRESS NOTES
"Shari Smith is a 33 y.o. female with Estimated Date of Delivery: 18   Obstetric History       T1      L1     SAB1   TAB0   Ectopic0   Multiple0   Live Births1         AT 34w4d  Here today on 2018 for No chief complaint on file.      Current Outpatient Medications   Medication Sig Dispense Refill    doxylamine-pyridoxine, vit B6, 10-10 mg TbEC Take 2 tablets by mouth every evening. 28 tablet 0    fluticasone furoate (ARNUITY ELLIPTA) 100 mcg/actuation DsDv Inhale 100 mcg into the lungs once daily. Controller      PNV #56-iron-folic acid-dha (OB COMPLETE PETITE) 35 mg iron-5 mg iron-1 mg Cap Take 1 capsule by mouth once daily. 30 capsule 11    PNV no.106-iron-folate no6-dha (OB COMPLETE GOLD) 27.5 mg iron- 1 mg Cap Take 1 capsule by mouth once daily. 30 capsule 11     No current facility-administered medications for this visit.      Review of patient's allergies indicates:  No Known Allergies    Past Medical History:   Diagnosis Date    Herpes simplex virus (HSV) infection     last outbreak "years ago" on valtrex since 38 weeks    Hypertension        Past Surgical History:   Procedure Laterality Date    BREAST SURGERY Bilateral     DILATION AND CURETTAGE OF UTERUS  Age 18     for heavy bleeding    PELVIC LAPAROSCOPY      REFRACTIVE SURGERY         OB History    Para Term  AB Living   3 1 1 0 1 1   SAB TAB Ectopic Multiple Live Births   1 0 0 0 1      # Outcome Date GA Lbr Westley/2nd Weight Sex Delivery Anes PTL Lv   3 Current            2 Term 10/03/17 40w1d  3.997 kg (8 lb 13 oz) F Vag-Spont EPI N ROSA ISELA   1 SAB                   Social History     Socioeconomic History    Marital status: Single     Spouse name: Not on file    Number of children: Not on file    Years of education: Not on file    Highest education level: Not on file   Social Needs    Financial resource strain: Not on file    Food insecurity - worry: Not on file    Food insecurity - inability: " Not on file    Transportation needs - medical: Not on file    Transportation needs - non-medical: Not on file   Occupational History    Not on file   Tobacco Use    Smoking status: Never Smoker    Smokeless tobacco: Never Used   Substance and Sexual Activity    Alcohol use: No    Drug use: No    Sexual activity: Yes     Partners: Male   Other Topics Concern    Not on file   Social History Narrative    Not on file       There were no vitals filed for this visit.    Review of Symptoms:  GENERAL: Denies fatigue, and malaise.   SKIN: Denies rash or lesions.   HEAD: Denies head injury or headache.   NODES: Denies enlarged lymph nodes.   CHEST: Denies chest pain or shortness of breath.   CARDIOVASCULAR: Denies palpitations or left sided chest pain.   ABDOMEN: No abdominal pain, constipation, diarrhea, nausea, vomiting or rectal bleeding.   URINARY: No frequency, urgency, dysuria, or hematuria  MUSCULOSKELETAL: Denies joint pain or swelling.   NEUROLOGIC: Denies seizures.    O POS    ASSESSMENT    No diagnosis found.    PLAN  1.  RTC 1 weeks    I have reviewed the blood pressure, urine results, fetal heart rate check, fundal height and agree.

## 2018-11-19 ENCOUNTER — ROUTINE PRENATAL (OUTPATIENT)
Dept: OBSTETRICS AND GYNECOLOGY | Facility: CLINIC | Age: 33
End: 2018-11-19
Payer: COMMERCIAL

## 2018-11-19 VITALS
DIASTOLIC BLOOD PRESSURE: 80 MMHG | WEIGHT: 236.31 LBS | SYSTOLIC BLOOD PRESSURE: 122 MMHG | BODY MASS INDEX: 38.15 KG/M2

## 2018-11-19 DIAGNOSIS — Z3A.36 36 WEEKS GESTATION OF PREGNANCY: Primary | ICD-10-CM

## 2018-11-19 LAB
BILIRUB SERPL-MCNC: NEGATIVE MG/DL
BLOOD URINE, POC: NEGATIVE
COLOR, POC UA: NORMAL
GLUCOSE UR QL STRIP: NEGATIVE
KETONES UR QL STRIP: NEGATIVE
LEUKOCYTE ESTERASE URINE, POC: NEGATIVE
NITRITE, POC UA: NEGATIVE
PH, POC UA: 6
PROTEIN, POC: NEGATIVE
SPECIFIC GRAVITY, POC UA: NORMAL
UROBILINOGEN, POC UA: NEGATIVE

## 2018-11-19 PROCEDURE — 0502F SUBSEQUENT PRENATAL CARE: CPT | Mod: S$GLB,,, | Performed by: OBSTETRICS & GYNECOLOGY

## 2018-11-19 PROCEDURE — 99999 PR PBB SHADOW E&M-EST. PATIENT-LVL III: CPT | Mod: PBBFAC,,, | Performed by: OBSTETRICS & GYNECOLOGY

## 2018-11-19 PROCEDURE — 87081 CULTURE SCREEN ONLY: CPT

## 2018-11-19 NOTE — PROGRESS NOTES
"Shari Smith is a 33 y.o. female with Estimated Date of Delivery: 18   Obstetric History       T1      L1     SAB1   TAB0   Ectopic0   Multiple0   Live Births1         AT 36w1d  Here today on 2018 for   Chief Complaint   Patient presents with    Routine Prenatal Visit       Current Outpatient Medications   Medication Sig Dispense Refill    PNV #56-iron-folic acid-dha (OB COMPLETE PETITE) 35 mg iron-5 mg iron-1 mg Cap Take 1 capsule by mouth once daily. 30 capsule 11    PNV no.106-iron-folate no6-dha (OB COMPLETE GOLD) 27.5 mg iron- 1 mg Cap Take 1 capsule by mouth once daily. 30 capsule 11     No current facility-administered medications for this visit.      Review of patient's allergies indicates:  No Known Allergies    Past Medical History:   Diagnosis Date    Herpes simplex virus (HSV) infection     last outbreak "years ago" on valtrex since 38 weeks    Hypertension        Past Surgical History:   Procedure Laterality Date    BREAST SURGERY Bilateral     DILATION AND CURETTAGE OF UTERUS  Age 18     for heavy bleeding    PELVIC LAPAROSCOPY      REFRACTIVE SURGERY         OB History    Para Term  AB Living   3 1 1 0 1 1   SAB TAB Ectopic Multiple Live Births   1 0 0 0 1      # Outcome Date GA Lbr Westley/2nd Weight Sex Delivery Anes PTL Lv   3 Current            2 Term 10/03/17 40w1d  3.997 kg (8 lb 13 oz) F Vag-Spont EPI N ROSA ISELA   1 SAB                   Social History     Socioeconomic History    Marital status: Single     Spouse name: Not on file    Number of children: Not on file    Years of education: Not on file    Highest education level: Not on file   Social Needs    Financial resource strain: Not on file    Food insecurity - worry: Not on file    Food insecurity - inability: Not on file    Transportation needs - medical: Not on file    Transportation needs - non-medical: Not on file   Occupational History    Not on file   Tobacco Use    Smoking " status: Never Smoker    Smokeless tobacco: Never Used   Substance and Sexual Activity    Alcohol use: No    Drug use: No    Sexual activity: Yes     Partners: Male   Other Topics Concern    Not on file   Social History Narrative    Not on file       Vitals:    11/19/18 0911   BP: 122/80       Review of Symptoms:  GENERAL: Denies fatigue, and malaise.   SKIN: Denies rash or lesions.   HEAD: Denies head injury or headache.   NODES: Denies enlarged lymph nodes.   CHEST: Denies chest pain or shortness of breath.   CARDIOVASCULAR: Denies palpitations or left sided chest pain.   ABDOMEN: No abdominal pain, constipation, diarrhea, nausea, vomiting or rectal bleeding.   URINARY: No frequency, urgency, dysuria, or hematuria  MUSCULOSKELETAL: Denies joint pain or swelling.   NEUROLOGIC: Denies seizures.    O POS    ASSESSMENT    Encounter Diagnoses   Name Primary?    36 weeks gestation of pregnancy Yes       PLAN  1.  RTC 1 weeks     I have reviewed the blood pressure, urine results, fetal heart rate check, fundal height and agree.

## 2018-11-21 LAB — BACTERIA SPEC AEROBE CULT: NORMAL

## 2018-11-26 ENCOUNTER — ROUTINE PRENATAL (OUTPATIENT)
Dept: OBSTETRICS AND GYNECOLOGY | Facility: CLINIC | Age: 33
End: 2018-11-26
Payer: COMMERCIAL

## 2018-11-26 VITALS
DIASTOLIC BLOOD PRESSURE: 68 MMHG | BODY MASS INDEX: 38.79 KG/M2 | WEIGHT: 240.31 LBS | SYSTOLIC BLOOD PRESSURE: 126 MMHG

## 2018-11-26 DIAGNOSIS — O09.93 HIGH-RISK PREGNANCY IN THIRD TRIMESTER: ICD-10-CM

## 2018-11-26 DIAGNOSIS — I10 ESSENTIAL HYPERTENSION: ICD-10-CM

## 2018-11-26 DIAGNOSIS — Z3A.37 37 WEEKS GESTATION OF PREGNANCY: Primary | ICD-10-CM

## 2018-11-26 PROCEDURE — 99999 PR PBB SHADOW E&M-EST. PATIENT-LVL III: CPT | Mod: PBBFAC,,, | Performed by: OBSTETRICS & GYNECOLOGY

## 2018-11-26 PROCEDURE — 0502F SUBSEQUENT PRENATAL CARE: CPT | Mod: S$GLB,,, | Performed by: OBSTETRICS & GYNECOLOGY

## 2018-11-26 NOTE — PROGRESS NOTES
"Shari Smith is a 33 y.o. female with Estimated Date of Delivery: 18   Obstetric History       T1      L1     SAB1   TAB0   Ectopic0   Multiple0   Live Births1         AT 37w1d  Here today on 2018 for   Chief Complaint   Patient presents with    Routine Prenatal Visit       Current Outpatient Medications   Medication Sig Dispense Refill    PNV #56-iron-folic acid-dha (OB COMPLETE PETITE) 35 mg iron-5 mg iron-1 mg Cap Take 1 capsule by mouth once daily. 30 capsule 11    PNV no.106-iron-folate no6-dha (OB COMPLETE GOLD) 27.5 mg iron- 1 mg Cap Take 1 capsule by mouth once daily. 30 capsule 11     No current facility-administered medications for this visit.      Review of patient's allergies indicates:  No Known Allergies    Past Medical History:   Diagnosis Date    Herpes simplex virus (HSV) infection     last outbreak "years ago" on valtrex since 38 weeks    Hypertension        Past Surgical History:   Procedure Laterality Date    BREAST SURGERY Bilateral     DILATION AND CURETTAGE OF UTERUS  Age 18     for heavy bleeding    PELVIC LAPAROSCOPY      REFRACTIVE SURGERY         OB History    Para Term  AB Living   3 1 1 0 1 1   SAB TAB Ectopic Multiple Live Births   1 0 0 0 1      # Outcome Date GA Lbr Westley/2nd Weight Sex Delivery Anes PTL Lv   3 Current            2 Term 10/03/17 40w1d  3.997 kg (8 lb 13 oz) F Vag-Spont EPI N ROSA ISELA   1 SAB                   Social History     Socioeconomic History    Marital status: Single     Spouse name: None    Number of children: None    Years of education: None    Highest education level: None   Social Needs    Financial resource strain: None    Food insecurity - worry: None    Food insecurity - inability: None    Transportation needs - medical: None    Transportation needs - non-medical: None   Occupational History    None   Tobacco Use    Smoking status: Never Smoker    Smokeless tobacco: Never Used   Substance and " Sexual Activity    Alcohol use: No    Drug use: No    Sexual activity: Yes     Partners: Male   Other Topics Concern    None   Social History Narrative    None       Vitals:    11/26/18 1019   BP: 126/68       Review of Symptoms:  GENERAL: Denies fatigue, and malaise.   SKIN: Denies rash or lesions.   HEAD: Denies head injury or headache.   NODES: Denies enlarged lymph nodes.   CHEST: Denies chest pain or shortness of breath.   CARDIOVASCULAR: Denies palpitations or left sided chest pain.   ABDOMEN: No abdominal pain, constipation, diarrhea, nausea, vomiting or rectal bleeding.   URINARY: No frequency, urgency, dysuria, or hematuria  MUSCULOSKELETAL: Denies joint pain or swelling.   NEUROLOGIC: Denies seizures.    O POS    ASSESSMENT    Encounter Diagnoses   Name Primary?    37 weeks gestation of pregnancy Yes    High-risk pregnancy in third trimester     Essential hypertension        PLAN  1.  RTC     I have reviewed the blood pressure, urine results, fetal heart rate check, fundal height and agree.

## 2018-12-06 ENCOUNTER — ROUTINE PRENATAL (OUTPATIENT)
Dept: OBSTETRICS AND GYNECOLOGY | Facility: CLINIC | Age: 33
End: 2018-12-06
Payer: COMMERCIAL

## 2018-12-06 VITALS
SYSTOLIC BLOOD PRESSURE: 122 MMHG | WEIGHT: 241.38 LBS | DIASTOLIC BLOOD PRESSURE: 82 MMHG | BODY MASS INDEX: 38.96 KG/M2

## 2018-12-06 DIAGNOSIS — Z3A.38 38 WEEKS GESTATION OF PREGNANCY: ICD-10-CM

## 2018-12-06 DIAGNOSIS — B00.9 HERPES INFECTION: ICD-10-CM

## 2018-12-06 DIAGNOSIS — I10 ESSENTIAL HYPERTENSION: ICD-10-CM

## 2018-12-06 DIAGNOSIS — O09.93 HIGH-RISK PREGNANCY IN THIRD TRIMESTER: Primary | ICD-10-CM

## 2018-12-06 LAB
BILIRUB SERPL-MCNC: NEGATIVE MG/DL
BLOOD URINE, POC: NEGATIVE
COLOR, POC UA: NORMAL
GLUCOSE UR QL STRIP: NEGATIVE
KETONES UR QL STRIP: NEGATIVE
LEUKOCYTE ESTERASE URINE, POC: NEGATIVE
NITRITE, POC UA: NEGATIVE
PH, POC UA: 7
PROTEIN, POC: NORMAL
SPECIFIC GRAVITY, POC UA: NORMAL
UROBILINOGEN, POC UA: NEGATIVE

## 2018-12-06 PROCEDURE — 0502F SUBSEQUENT PRENATAL CARE: CPT | Mod: S$GLB,,, | Performed by: OBSTETRICS & GYNECOLOGY

## 2018-12-06 PROCEDURE — 99999 PR PBB SHADOW E&M-EST. PATIENT-LVL III: CPT | Mod: PBBFAC,,, | Performed by: OBSTETRICS & GYNECOLOGY

## 2018-12-06 NOTE — PROGRESS NOTES
"Shari Smith is a 33 y.o. female with Estimated Date of Delivery: 18   Obstetric History       T1      L1     SAB1   TAB0   Ectopic0   Multiple0   Live Births1         AT 38w4d  Here today on 2018 for   Chief Complaint   Patient presents with    Routine Prenatal Visit       Current Outpatient Medications   Medication Sig Dispense Refill    PNV #56-iron-folic acid-dha (OB COMPLETE PETITE) 35 mg iron-5 mg iron-1 mg Cap Take 1 capsule by mouth once daily. 30 capsule 11    PNV no.106-iron-folate no6-dha (OB COMPLETE GOLD) 27.5 mg iron- 1 mg Cap Take 1 capsule by mouth once daily. 30 capsule 11     No current facility-administered medications for this visit.      Review of patient's allergies indicates:  No Known Allergies    Past Medical History:   Diagnosis Date    Herpes simplex virus (HSV) infection     last outbreak "years ago" on valtrex since 38 weeks    Hypertension        Past Surgical History:   Procedure Laterality Date    BREAST SURGERY Bilateral     DILATION AND CURETTAGE OF UTERUS  Age 18     for heavy bleeding    PELVIC LAPAROSCOPY      REFRACTIVE SURGERY         OB History    Para Term  AB Living   3 1 1 0 1 1   SAB TAB Ectopic Multiple Live Births   1 0 0 0 1      # Outcome Date GA Lbr Westley/2nd Weight Sex Delivery Anes PTL Lv   3 Current            2 Term 10/03/17 40w1d  3.997 kg (8 lb 13 oz) F Vag-Spont EPI N ROSA ISELA   1 SAB                   Social History     Socioeconomic History    Marital status: Single     Spouse name: Not on file    Number of children: Not on file    Years of education: Not on file    Highest education level: Not on file   Social Needs    Financial resource strain: Not on file    Food insecurity - worry: Not on file    Food insecurity - inability: Not on file    Transportation needs - medical: Not on file    Transportation needs - non-medical: Not on file   Occupational History    Not on file   Tobacco Use    Smoking " status: Never Smoker    Smokeless tobacco: Never Used   Substance and Sexual Activity    Alcohol use: No    Drug use: No    Sexual activity: Yes     Partners: Male   Other Topics Concern    Not on file   Social History Narrative    Not on file       Vitals:    12/06/18 0920   BP: 122/82       Review of Symptoms:  GENERAL: Denies fatigue, and malaise.   SKIN: Denies rash or lesions.   HEAD: Denies head injury or headache.   NODES: Denies enlarged lymph nodes.   CHEST: Denies chest pain or shortness of breath.   CARDIOVASCULAR: Denies palpitations or left sided chest pain.   ABDOMEN: No abdominal pain, constipation, diarrhea, nausea, vomiting or rectal bleeding.   URINARY: No frequency, urgency, dysuria, or hematuria  MUSCULOSKELETAL: Denies joint pain or swelling.   NEUROLOGIC: Denies seizures.    O POS    ASSESSMENT    Encounter Diagnoses   Name Primary?    38 weeks gestation of pregnancy Yes       PLAN  1.  Induction planned  Monday night at 10 pm with cytotec 50 ug vaginally then PIT 4 hour later. Risks discussed.     I have reviewed the blood pressure, urine results, fetal heart rate check, fundal height and agree.

## 2018-12-10 PROBLEM — Z34.90 ENCOUNTER FOR INDUCTION OF LABOR: Status: ACTIVE | Noted: 2018-12-10

## 2018-12-12 ENCOUNTER — TELEPHONE (OUTPATIENT)
Dept: OBSTETRICS AND GYNECOLOGY | Facility: CLINIC | Age: 33
End: 2018-12-12

## 2018-12-12 NOTE — TELEPHONE ENCOUNTER
----- Message from Douglas Lerner sent at 12/12/2018  2:58 PM CST -----  Type: Needs Medical Advice    Who Called:  Maude  -Charm   Symptoms (please be specific): NA  How long has patient had these symptoms:  NA Pharmacy name and phone #:  Best Call Back Number: [phone]- claim # [id number]  Additional Information: The representative called asking for the delivery date of pregnancy , did the office receive a faxed regarding the above listed patient.

## 2018-12-17 ENCOUNTER — PATIENT MESSAGE (OUTPATIENT)
Dept: OBSTETRICS AND GYNECOLOGY | Facility: CLINIC | Age: 33
End: 2018-12-17

## 2019-01-07 ENCOUNTER — POSTPARTUM VISIT (OUTPATIENT)
Dept: OBSTETRICS AND GYNECOLOGY | Facility: CLINIC | Age: 34
End: 2019-01-07
Payer: COMMERCIAL

## 2019-01-07 VITALS
SYSTOLIC BLOOD PRESSURE: 104 MMHG | BODY MASS INDEX: 33.39 KG/M2 | DIASTOLIC BLOOD PRESSURE: 74 MMHG | WEIGHT: 213.19 LBS

## 2019-01-07 PROCEDURE — 99999 PR PBB SHADOW E&M-EST. PATIENT-LVL III: CPT | Mod: PBBFAC,,, | Performed by: OBSTETRICS & GYNECOLOGY

## 2019-01-07 PROCEDURE — 0503F POSTPARTUM CARE VISIT: CPT | Mod: S$GLB,,, | Performed by: OBSTETRICS & GYNECOLOGY

## 2019-01-07 PROCEDURE — 99999 PR PBB SHADOW E&M-EST. PATIENT-LVL III: ICD-10-PCS | Mod: PBBFAC,,, | Performed by: OBSTETRICS & GYNECOLOGY

## 2019-01-07 PROCEDURE — 0503F PR POSTPARTUM CARE VISIT: ICD-10-PCS | Mod: S$GLB,,, | Performed by: OBSTETRICS & GYNECOLOGY

## 2019-01-07 NOTE — PROGRESS NOTES
"POST-PARTUM  2019    Shari Smith presents today for postpartum care    OB History    Para Term  AB Living   3 2 2 0 1 2   SAB TAB Ectopic Multiple Live Births   1 0 0 0 2      # Outcome Date GA Lbr Westley/2nd Weight Sex Delivery Anes PTL Lv   3 Term 18 39w2d / 00:14 3.997 kg (8 lb 13 oz) F Vag-Spont EPI N ROSA ISELA   2 Term 10/03/17 40w1d  3.997 kg (8 lb 13 oz) F Vag-Spont EPI N ROSA ISELA   1 SAB                   Past Surgical History:   Procedure Laterality Date    BREAST SURGERY Bilateral     DILATION AND CURETTAGE OF UTERUS  Age 18     for heavy bleeding    PELVIC LAPAROSCOPY      REFRACTIVE SURGERY         Past Medical History:   Diagnosis Date    Herpes simplex virus (HSV) infection     last outbreak "years ago" on valtrex since 38 weeks    Hypertension     first pregnancy only     Current Outpatient Medications   Medication Sig Dispense Refill    ibuprofen (ADVIL,MOTRIN) 600 MG tablet Take 1 tablet (600 mg total) by mouth every 6 (six) hours as needed for Pain. 20 tablet 3    oxyCODONE-acetaminophen (PERCOCET) 5-325 mg per tablet Take 1 tablet by mouth every 4 (four) hours as needed for Pain. 12 tablet 0    PNV #56-iron-folic acid-dha (OB COMPLETE PETITE) 35 mg iron-5 mg iron-1 mg Cap Take 1 capsule by mouth once daily. 30 capsule 11    PNV no.106-iron-folate no6-dha (OB COMPLETE GOLD) 27.5 mg iron- 1 mg Cap Take 1 capsule by mouth once daily. 30 capsule 11     No current facility-administered medications for this visit.      Review of patient's allergies indicates:  No Known Allergies  Social History     Socioeconomic History    Marital status: Single     Spouse name: None    Number of children: None    Years of education: None    Highest education level: None   Social Needs    Financial resource strain: None    Food insecurity - worry: None    Food insecurity - inability: None    Transportation needs - medical: None    Transportation needs - non-medical: None "   Occupational History    None   Tobacco Use    Smoking status: Never Smoker    Smokeless tobacco: Never Used   Substance and Sexual Activity    Alcohol use: No    Drug use: No    Sexual activity: Yes     Partners: Male   Other Topics Concern    None   Social History Narrative    None     /74   Wt 96.7 kg (213 lb 3 oz)   LMP 03/04/2018     PE:  General: Appears well  Neck: Supple, no lymphadenopathy or thyromegaly  Abdomen: Soft, no tenderness, no distention, no hepatosplenomegaly  Extremities: No cords or edema  Genitourinary:  External genitalia within normal limits  Vaginal mucosa moist and pink without lesions or discharge  Cervix appears without lesions, discharge or tenderness  Uterus is involuted to normal size, shape and nontender  Adnexa: no masses or tenderness    Diagnosis:  No diagnosis found.    Plan:   1.  pap smear due:2/20  2.  desires contraception:unsure.